# Patient Record
Sex: FEMALE | Race: BLACK OR AFRICAN AMERICAN | Employment: OTHER | URBAN - METROPOLITAN AREA
[De-identification: names, ages, dates, MRNs, and addresses within clinical notes are randomized per-mention and may not be internally consistent; named-entity substitution may affect disease eponyms.]

---

## 2017-01-24 ENCOUNTER — HOSPITAL ENCOUNTER (EMERGENCY)
Age: 52
Discharge: HOME | End: 2017-01-24
Attending: EMERGENCY MEDICINE
Payer: COMMERCIAL

## 2017-01-24 VITALS
SYSTOLIC BLOOD PRESSURE: 135 MMHG | HEART RATE: 92 BPM | DIASTOLIC BLOOD PRESSURE: 89 MMHG | TEMPERATURE: 98.1 F | RESPIRATION RATE: 18 BRPM | BODY MASS INDEX: 31.58 KG/M2 | WEIGHT: 185 LBS | OXYGEN SATURATION: 97 % | HEIGHT: 64 IN

## 2017-01-24 DIAGNOSIS — M54.42 ACUTE BILATERAL LOW BACK PAIN WITH BILATERAL SCIATICA: Primary | ICD-10-CM

## 2017-01-24 DIAGNOSIS — M54.41 ACUTE BILATERAL LOW BACK PAIN WITH BILATERAL SCIATICA: Primary | ICD-10-CM

## 2017-01-24 PROCEDURE — 99281 EMR DPT VST MAYX REQ PHY/QHP: CPT

## 2017-01-24 RX ORDER — TRAMADOL HYDROCHLORIDE 50 MG/1
50 TABLET ORAL
Qty: 12 TAB | Refills: 0 | Status: SHIPPED | OUTPATIENT
Start: 2017-01-24 | End: 2017-06-09 | Stop reason: ALTCHOICE

## 2017-01-24 RX ORDER — METHOCARBAMOL 500 MG/1
500 TABLET, FILM COATED ORAL 4 TIMES DAILY
Qty: 20 TAB | Refills: 0 | Status: SHIPPED | OUTPATIENT
Start: 2017-01-24 | End: 2017-06-01 | Stop reason: SDUPTHER

## 2017-01-24 RX ORDER — PREDNISONE 10 MG/1
TABLET ORAL
Qty: 21 TAB | Refills: 0 | Status: SHIPPED | OUTPATIENT
Start: 2017-01-24 | End: 2017-02-06

## 2017-01-25 NOTE — ED PROVIDER NOTES
Patient is a 49 y.o. female presenting with back pain and leg pain. The history is provided by the patient. Back Pain    This is a recurrent problem. Episode onset: This episode 3 weeks ago. The problem has not changed since onset. The problem occurs constantly. Patient reports not work related injury. The pain is associated with no known injury. The pain is present in the lumbar spine. The quality of the pain is described as shooting and cramping. The pain is at a severity of 5/10. The symptoms are aggravated by certain positions. Associated symptoms include leg pain. Pertinent negatives include no chest pain, no fever, no numbness, no weight loss, no headaches, no abdominal pain, no abdominal swelling, no bowel incontinence, no perianal numbness, no bladder incontinence, no dysuria, no pelvic pain, no paresthesias, no paresis, no tingling and no weakness. She has tried NSAIDs for the symptoms. Improvement on treatment: Was helping in the beginning, now not helping. Last dose 2 hours ago. The patient's surgical history includes epidural.  Leg Pain    This is a recurrent problem. The current episode started more than 1 week ago. The problem occurs constantly. The problem has not changed since onset. The pain is present in the left upper leg and right upper leg. The quality of the pain is described as aching. The pain is at a severity of 5/10. Associated symptoms include back pain. Pertinent negatives include no numbness, full range of motion, no stiffness, no tingling, no itching and no neck pain. She has tried OTC pain medications for the symptoms. The treatment provided no relief. There has been no history of extremity trauma. No other complaints. No recent fall nor history of overuse. No bowel or bladder in continence. No saddle anesthesia. No hx if IVDU. No FCNVDC, blood in urine or stool, urinary symptoms, abd or flank pain, swollen glands, rashes.       Past Medical History:   Diagnosis Date    Breast cancer [facility])      right breast- chemo and radiation    Diabetes ([facility] [address] 2016    GERD (gastroesophageal reflux disease)     H/O: pituitary tumor 2016    Hyperlipidemia 2016     initial     Hypertension 2016    Subclinical hypothyroidism 2016    Thyroid nodule 2016     multiple- no suspicous concerns       Past Surgical History:   Procedure Laterality Date    Hx gyn        x2    Implant breast silicone/eq Bilateral 4592    Hx mastectomy Bilateral 2010    Hx tumor removal  2016     pituitary tumor         Family History:   Problem Relation Age of Onset    Hypertension Mother     Diabetes Mother     Hypertension Father     Diabetes Father     Cancer Neg Hx     Stroke Neg Hx     Heart Disease Neg Hx        Social History     Social History    Marital status: SINGLE     Spouse name: N/A    Number of children: N/A    Years of education: N/A     Occupational History    Not on file. Social History Main Topics    Smoking status: Never Smoker    Smokeless tobacco: Never Used    Alcohol use No    Drug use: No    Sexual activity: Not on file     Other Topics Concern    Not on file     Social History Narrative         ALLERGIES: Adhesive tape-silicones and Morphine    Review of Systems   Constitutional: Negative for chills, fever and weight loss. HENT: Negative for ear pain, rhinorrhea and sore throat. Eyes: Negative for pain and redness. Respiratory: Negative for cough and shortness of breath. Cardiovascular: Negative for chest pain. Gastrointestinal: Negative for abdominal pain, bowel incontinence, constipation, diarrhea, nausea and vomiting. Genitourinary: Negative for bladder incontinence, dysuria and pelvic pain. Musculoskeletal: Positive for back pain and myalgias. Negative for arthralgias, gait problem, joint swelling, neck pain, neck stiffness and stiffness. Skin: Negative. Negative for itching.    Neurological: Negative for dizziness, tingling, weakness, light-headedness, numbness, headaches and paresthesias. Psychiatric/Behavioral: Negative. Vitals:    01/24/17 1931   BP: 135/89   Pulse: 92   Resp: 18   Temp: 98.1 °F (36.7 °C)   SpO2: 97%   Weight: 83.9 kg (185 lb)   Height: 5' 4\" (1.626 m)            Physical Exam   Constitutional: She appears well-developed and well-nourished. No distress. HENT:   Head: Normocephalic and atraumatic. Right Ear: Tympanic membrane, external ear and ear canal normal.   Left Ear: Tympanic membrane, external ear and ear canal normal.   Nose: Nose normal.   Mouth/Throat: Oropharynx is clear and moist and mucous membranes are normal.   Eyes: Conjunctivae and EOM are normal. Pupils are equal, round, and reactive to light. Neck: Normal range of motion. Neck supple. Cardiovascular: Normal rate, regular rhythm and normal heart sounds. Pulmonary/Chest: Effort normal and breath sounds normal.   Abdominal: Soft. Bowel sounds are normal. She exhibits no distension. There is no tenderness. There is no rebound and no guarding. Musculoskeletal: Normal range of motion. Right hip: Normal.        Left hip: Normal.        Cervical back: Normal.        Thoracic back: Normal.        Lumbar back: She exhibits tenderness, pain and spasm. She exhibits normal range of motion, no bony tenderness, no swelling, no edema, no deformity, no laceration and normal pulse. Right upper leg: She exhibits no tenderness, no bony tenderness, no swelling, no edema, no deformity and no laceration. Left upper leg: She exhibits no tenderness, no bony tenderness, no swelling, no edema, no deformity and no laceration. Right foot: Normal.        Left foot: Normal.   Lower bilateral paralumbar reproducible tenderness to palpation. (-) deformity, swelling, erythema, skin changes, midline tenderness or crepitus. (-) step off. Positive (mild) SLR bilaterlly.   FROM at the waist. Full sensation to deep and light palpation bilaterally. (-) foot drop     Lymphadenopathy:     She has no cervical adenopathy. Neurological: She is alert. She has normal strength. She is not disoriented. She displays no atrophy, no tremor and normal reflexes. No cranial nerve deficit or sensory deficit. She exhibits normal muscle tone. She displays a negative Romberg sign. She displays no seizure activity. Coordination and gait normal. GCS eye subscore is 4. GCS verbal subscore is 5. GCS motor subscore is 6. Skin: Skin is warm and dry. She is not diaphoretic. Psychiatric: She has a normal mood and affect. Her behavior is normal. Judgment and thought content normal.   Nursing note and vitals reviewed. MDM  Number of Diagnoses or Management Options  Diagnosis management comments: DDx: sciatica, spinal stenosis, arthritis, DJD, spondylitis, muscular pain/spasm, Fx (traumatic, compression, etc.), cauda equina, epidural abscess, UTI, pyelo, STD,  Eygk-Kudf-Ucilrk syndrome, kidney stones, preg, ectopic, ovarian cyst, ovarian torsion, GI etiology (constipation, pancreatitis, hepatitis, gastritis, diverticulitis, colitis, gastric cancer, etc), acute abdomen (appendicitis, SBO, etc.), AAA, malignancy    IMPRESSION AND MEDICAL DECISION MAKING:  Well-appearing pt with c/o lumbar back pain, exam is benign, NVI, sensorimotor intact, stable gait, no indicators of back emergencies, no need for imaging, will dc with supportive care. Based upon the patients presentation with noted HPI and PE, along with the work up done in the emergency department, I believe that the patient is having bilater paralumbar myofascial strain (lower back strain) with sciatica. PROGRESS: stable and improved    SPECIFIC PATIENT INSTRUCTIONS FROM THE PROVIDER WHO TREATED YOU IN THE ER TODAY:  Apply heat or cool compresses (whichever provides better relief). Start doing gentle exercises and stretches as tolerated. Drink plenty of fluids.    Finish Prednisone as directed. Take Robaxin for muscular discomfort or spasms as needed as directed. Note that this medicine may cause drowsiness. Take Tylenol/Acetaminophen (every 4-6 hours) for fever or pain as needed. Take tramadol for breakthrough pain and note that it may cause drowsiness. TAKE APPROPRIATE PRECAUTIONS. DO NOT DRIVE OR OPERATE HEAVY MACHINERY WHILE USING. Return if any concerns or worsening condition(s). Your primary doctor and orthopedic specialist or the ones provided in 1 week. Pt results have been reviewed with them. They have been counseled regarding diagnosis, treatment, and plan. Pt verbally conveys understanding and agreement of the signs, symptoms, diagnosis, treatment and prognosis and additionally agrees to follow up as discussed. Pt also agrees with the care-plan and conveys that all of their questions have been answered. I have also provided discharge instructions for them that include: educational information regarding their diagnosis and treatment, and list of reasons why they would want to return to the ED prior to their follow-up appointment, should their condition change. Rizwan HellerrPINEDA 8:36 PM           Amount and/or Complexity of Data Reviewed  Discussion of test results with the performing providers: yes  Decide to obtain previous medical records or to obtain history from someone other than the patient: yes  Obtain history from someone other than the patient: yes  Review and summarize past medical records: yes  Discuss the patient with other providers: yes  Independent visualization of images, tracings, or specimens: yes    Risk of Complications, Morbidity, and/or Mortality  Presenting problems: low  Diagnostic procedures: low  Management options: low    Patient Progress  Patient progress: stable    ED Course       Procedures      Diagnosis:   1.  Acute bilateral low back pain with bilateral sciatica          Disposition: home    Follow-up Information     Follow up With Details Comments Contact Info    [facility] EMERGENCY DEPT  As needed, If symptoms worsen [address]     Saida Govea NP In 3 days  [facility] [place] [address]  [phone]      [address] In 1 week  [address]  [phone]          Patient's Medications   Start Taking    METHOCARBAMOL (ROBAXIN) 500 MG TABLET    Take 1 Tab by mouth four (4) times daily for 5 days. PREDNISONE (STERAPRED DS) 10 MG DOSE PACK    Take as directed. TRAMADOL (ULTRAM) 50 MG TABLET    Take 1 Tab by mouth every six (6) hours as needed for Pain. Max Daily Amount: 200 mg. Continue Taking    ALBUTEROL (PROVENTIL HFA, VENTOLIN HFA, PROAIR HFA) 90 MCG/ACTUATION INHALER    Take 1 Puff by inhalation every four (4) hours as needed for Wheezing or Shortness of Breath. AMLODIPINE (NORVASC) 5 MG TABLET    Take 1 Tab by mouth daily. ATORVASTATIN (LIPITOR) 40 MG TABLET    Take 1 Tab by mouth daily. GLIMEPIRIDE (AMARYL) 2 MG TABLET    Take 1 Tab by mouth every morning. GLUCOSE BLOOD VI TEST STRIPS (ASCENSIA AUTODISC VI, ONE TOUCH ULTRA TEST VI) STRIP    Check glucose daily in the AM    HYDROCHLOROTHIAZIDE (HYDRODIURIL) 25 MG TABLET    Take 1 Tab by mouth daily. LANCETS MISC    Check glucose daily. One touch Ultra lancets. METFORMIN ER (GLUCOPHAGE XR) 500 MG TABLET    Take 1 Tab by mouth two (2) times daily (with meals). RANITIDINE (ZANTAC) 150 MG TABLET    Take 1 Tab by mouth two (2) times a day. These Medications have changed    No medications on file   Stop Taking    BENZONATATE (TESSALON) 100 MG CAPSULE    Take 1 Cap by mouth three (3) times daily as needed for Cough. CYCLOBENZAPRINE (FLEXERIL) 5 MG TABLET    Take 1 Tab by mouth every twelve (12) hours as needed for Muscle Spasm(s).     LORATADINE (CLARITIN) 10 MG TABLET    Take 1 Tab by mouth daily.

## 2017-01-25 NOTE — ED NOTES
Ramon Ames is a 49 y.o. female that was discharged in good condition. The patients diagnosis, condition and treatment were explained to  patient and aftercare instructions were given. The patient verbalized understanding. Patient armband removed and shredded.

## 2017-01-26 ENCOUNTER — OFFICE VISIT (OUTPATIENT)
Dept: FAMILY MEDICINE CLINIC | Age: 52
End: 2017-01-26

## 2017-01-26 VITALS
TEMPERATURE: 97.8 F | BODY MASS INDEX: 31.58 KG/M2 | RESPIRATION RATE: 18 BRPM | HEART RATE: 98 BPM | HEIGHT: 64 IN | DIASTOLIC BLOOD PRESSURE: 76 MMHG | OXYGEN SATURATION: 94 % | WEIGHT: 185 LBS | SYSTOLIC BLOOD PRESSURE: 108 MMHG

## 2017-01-26 DIAGNOSIS — K59.00 CONSTIPATION, UNSPECIFIED CONSTIPATION TYPE: ICD-10-CM

## 2017-01-26 DIAGNOSIS — R09.89 SENSATION OF FOREIGN BODY IN THROAT: ICD-10-CM

## 2017-01-26 DIAGNOSIS — Z12.11 SCREENING FOR COLORECTAL CANCER: ICD-10-CM

## 2017-01-26 DIAGNOSIS — R13.10 DYSPHAGIA, UNSPECIFIED TYPE: ICD-10-CM

## 2017-01-26 DIAGNOSIS — Z12.12 SCREENING FOR COLORECTAL CANCER: ICD-10-CM

## 2017-01-26 DIAGNOSIS — M54.50 ACUTE BILATERAL LOW BACK PAIN WITHOUT SCIATICA: Primary | ICD-10-CM

## 2017-01-26 RX ORDER — AMOXICILLIN 250 MG
1 CAPSULE ORAL DAILY
Qty: 30 TAB | Refills: 11 | Status: SHIPPED | OUTPATIENT
Start: 2017-01-26 | End: 2021-08-26

## 2017-01-26 RX ORDER — DOCUSATE SODIUM 100 MG/1
100 CAPSULE, LIQUID FILLED ORAL 2 TIMES DAILY
Qty: 60 CAP | Refills: 11 | Status: SHIPPED | OUTPATIENT
Start: 2017-01-26 | End: 2017-06-09 | Stop reason: SDUPTHER

## 2017-01-26 NOTE — PROGRESS NOTES
HISTORY OF PRESENT ILLNESS  Suyapa Markham is a 49 y.o. female. 1/26/2017  11:41 AM    Chief Complaint   Patient presents with   [facility] Follow Up     leg and back pain       HPI: Here today for follow up from ER visit- evaluated 1/24 for back pain and leg pain. Given Tramadol, Robaxin, and Prednisone taper. No imaging or work up done in ER. She reports taking medications as prescribed and that she has been doing much better. She denies any new symptoms and pain is controlled. She denies any numbness or tingling. Continues to complain of sensation that something is stuck in her throat and that she has difficulty swallowing. She eats and drinks without difficulty when she does consume something. She reports decreased appetite and constipation as well. She reports that she is taking Colace almost every day and still not having regular, soft BMs. Denies any blood in stools. She denies any abdominal pain, heartburn, or vomiting. Still has not gotten swallow study completed that was ordered several months ago and has not gotten set up for colonoscopy. Review of Systems   Constitutional: Negative for chills and fever. HENT: Negative for ear pain and sore throat.         +sensation of something stuck in throat   Respiratory: Negative for shortness of breath, wheezing and stridor. Cardiovascular: Negative for chest pain and palpitations. Gastrointestinal: Positive for constipation. Negative for abdominal pain, blood in stool, diarrhea, heartburn, nausea and vomiting. Genitourinary: Negative for dysuria, frequency and urgency. Musculoskeletal: Negative for back pain, falls and joint pain. Neurological: Negative for tingling and sensory change.       PHQ Screening   PHQ 2 / 9, over the last two weeks 1/26/2017   Little interest or pleasure in doing things Not at all   Feeling down, depressed or hopeless Not at all   Total Score PHQ 2 0         History  Past Medical History   Diagnosis Date    Breast cancer ([facility] [address]      right breast- chemo and radiation    Diabetes ([facility] [address] 2016    GERD (gastroesophageal reflux disease)     H/O: pituitary tumor 2016    Hyperlipidemia 2016     initial     Hypertension 2016    Subclinical hypothyroidism 2016    Thyroid nodule 2016     multiple- no suspicous concerns       Past Surgical History   Procedure Laterality Date    Hx gyn        x2    Implant breast silicone/eq Bilateral 5170    Hx mastectomy Bilateral     Hx tumor removal  2016     pituitary tumor       Social History     Social History    Marital status: SINGLE     Spouse name: N/A    Number of children: N/A    Years of education: N/A     Occupational History    Not on file. Social History Main Topics    Smoking status: Never Smoker    Smokeless tobacco: Never Used    Alcohol use No    Drug use: No    Sexual activity: Not on file     Other Topics Concern    Not on file     Social History Narrative       Allergies   Allergen Reactions    Adhesive Tape-Silicones Rash    Morphine Itching       Current Outpatient Prescriptions   Medication Sig Dispense Refill    predniSONE (STERAPRED DS) 10 mg dose pack Take as directed. 21 Tab 0    methocarbamol (ROBAXIN) 500 mg tablet Take 1 Tab by mouth four (4) times daily for 5 days. 20 Tab 0    traMADol (ULTRAM) 50 mg tablet Take 1 Tab by mouth every six (6) hours as needed for Pain. Max Daily Amount: 200 mg. 12 Tab 0    hydroCHLOROthiazide (HYDRODIURIL) 25 mg tablet Take 1 Tab by mouth daily. 90 Tab 1    metFORMIN ER (GLUCOPHAGE XR) 500 mg tablet Take 1 Tab by mouth two (2) times daily (with meals). 180 Tab 1    atorvastatin (LIPITOR) 40 mg tablet Take 1 Tab by mouth daily. 90 Tab 1    amLODIPine (NORVASC) 5 mg tablet Take 1 Tab by mouth daily. 90 Tab 1    glimepiride (AMARYL) 2 mg tablet Take 1 Tab by mouth every morning.  90 Tab 1    raNITIdine (ZANTAC) 150 mg tablet Take 1 Tab by mouth two (2) times a day. 180 Tab 3    albuterol (PROVENTIL HFA, VENTOLIN HFA, PROAIR HFA) 90 mcg/actuation inhaler Take 1 Puff by inhalation every four (4) hours as needed for Wheezing or Shortness of Breath. 1 Inhaler 0    glucose blood VI test strips (ASCENSIA AUTODISC VI, ONE TOUCH ULTRA TEST VI) strip Check glucose daily in the AM 50 Strip 0    Lancets misc Check glucose daily. One touch Ultra lancets. 50 Each 0         Patient Care Team:  Patient Care Team:  Darell Machado NP as PCP - General (Nurse Practitioner)  Santiago Brennan RN as Nurse Corey Paulino MD (Neurosurgery)        LABS:  None new to review    RADIOLOGY:  None new to review      Physical Exam   Constitutional: She is oriented to person, place, and time. She appears well-developed and well-nourished. No distress. HENT:   Mouth/Throat: Uvula is midline, oropharynx is clear and moist and mucous membranes are normal.   Neck: Normal range of motion. Neck supple. No tracheal deviation present. Cardiovascular: Normal rate, regular rhythm and normal heart sounds. No murmur heard. Pulmonary/Chest: Effort normal and breath sounds normal. No respiratory distress. Abdominal: Soft. Bowel sounds are normal. There is no tenderness. There is no rebound and no guarding. Musculoskeletal: She exhibits no edema. Lumbar back: She exhibits normal range of motion, no tenderness, no swelling, no pain and no spasm. Lymphadenopathy:     She has no cervical adenopathy. Neurological: She is alert and oriented to person, place, and time. She exhibits normal muscle tone. Coordination normal.   Skin: Skin is warm and dry.         Vitals:    01/26/17 1127   BP: 108/76   Pulse: 98   Resp: 18   Temp: 97.8 °F (36.6 °C)   TempSrc: Oral   SpO2: 94%   Weight: 185 lb (83.9 kg)   Height: 5' 4\" (1.626 m)   PainSc:   0 - No pain     Wt Readings from Last 3 Encounters:   01/26/17 185 lb (83.9 kg)   01/24/17 185 lb (83.9 kg)   12/20/16 193 lb (87.5 kg)       ASSESSMENT and Mariaelena Katz was seen today for hospital follow up. Diagnoses and all orders for this visit:    Acute bilateral low back pain without sciatica  *Resolved. Continue current medications- finish steroid pack. Dysphagia, unspecified type/ Sensation of foreign body in throat  *Discussed with patient- she has not even started initial work up that was ordered months ago. Called and got her scheduled for tomorrow to have swallow study completed- instructed her to go to [facility] and have done as scheduled. Constipation, unspecified constipation type  *Discussed. Recommend to increase up on stool softener BID. If not effective after a week or so, then change to Doc-Q-Lax. Colonoscopy will be beneficial.   -     docusate sodium (COLACE) 100 mg capsule; Take 1 Cap by mouth two (2) times a day. -     senna-docusate (DOC-Q-LAX) 8.6-50 mg per tablet; Take 1 Tab by mouth daily. Screening for colorectal cancer  *[facility] while patient in office. Appointment made for 2/1. Instructions given to patient and encouraged to go as scheduled. *Plan of care reviewed with patient. Patient in agreement with plan and expresses understanding. All questions answered and patient encouraged to call or RTO if further questions or concerns. Follow-up Disposition:  Return for already scheduled appointment. Nii Dailey

## 2017-01-26 NOTE — TELEPHONE ENCOUNTER
Return call made to pt using two identifiers. Pt made aware that she can try the OTC medications Robitussin or  Delsm for her cough and if the cough continues she can come back in to be evaluated. Pt verbalized understanding.

## 2017-01-26 NOTE — PATIENT INSTRUCTIONS
Constipation: Care Instructions  Your Care Instructions  Constipation means that you have a hard time passing stools (bowel movements). People pass stools from 3 times a day to once every 3 days. What is normal for you may be different. Constipation may occur with pain in the rectum and cramping. The pain may get worse when you try to pass stools. Sometimes there are small amounts of bright red blood on toilet paper or the surface of stools. This is because of enlarged veins near the rectum (hemorrhoids). A few changes in your diet and lifestyle may help you avoid ongoing constipation. Your doctor may also prescribe medicine to help loosen your stool. Some medicines can cause constipation. These include pain medicines and antidepressants. Tell your doctor about all the medicines you take. Your doctor may want to make a medicine change to ease your symptoms. Follow-up care is a key part of your treatment and safety. Be sure to make and go to all appointments, and call your doctor if you are having problems. It's also a good idea to know your test results and keep a list of the medicines you take. How can you care for yourself at home? · Drink plenty of fluids, enough so that your urine is light yellow or clear like water. If you have kidney, heart, or liver disease and have to limit fluids, talk with your doctor before you increase the amount of fluids you drink. · Include high-fiber foods in your diet each day. These include fruits, vegetables, beans, and whole grains. · Get at least 30 minutes of exercise on most days of the week. Walking is a good choice. You also may want to do other activities, such as running, swimming, cycling, or playing tennis or team sports. · Take a fiber supplement, such as Citrucel or Metamucil, every day. Read and follow all instructions on the label. · Schedule time each day for a bowel movement. A daily routine may help.  Take your time having your bowel movement. · Support your feet with a small step stool when you sit on the toilet. This helps flex your hips and places your pelvis in a squatting position. · Your doctor may recommend an over-the-counter laxative to relieve your constipation. Examples are Milk of Magnesia and MiraLax. Read and follow all instructions on the label. Do not use laxatives on a long-term basis. When should you call for help? Call your doctor now or seek immediate medical care if:  · You have new or worse belly pain. · You have new or worse nausea or vomiting. · You have blood in your stools. Watch closely for changes in your health, and be sure to contact your doctor if:  · Your constipation is getting worse. · You do not get better as expected. Where can you learn more? Go to http://kvng-koki.info/. Enter 21 [phone] in the search box to learn more about \"Constipation: Care Instructions. \"  Current as of: May 27, 2016  Content Version: 11.1  © 4582-1561 [employer]. Care instructions adapted under license by [facility] (which disclaims liability or warranty for this information). If you have questions about a medical condition or this instruction, always ask your healthcare professional. [address] any warranty or liability for your use of this information. Learning About Swallowing Problems  What are swallowing problems? Certain health problems that affect the nervous system can cause trouble swallowing. These conditions include stroke, ALS (also known as Dora Gehrig's disease), Parkinson's disease, and multiple sclerosis. The muscles and nerves that help move food through the throat and esophagus may not work right. Growths, such as cancer, and other problems with your esophagus can also make it hard to swallow. The esophagus is the tube that leads from your throat to your stomach. How are swallowing problems diagnosed?   A doctor or speech therapist will examine you to check for swallowing problems. You may get swallowing tests to check how well your throat muscles work. For these tests, you swallow a special liquid that helps the doctor see your throat and esophagus on an X-ray or video screen. Other tests use a thin, flexible tube called a scope to check for problems with your esophagus. The doctor puts the scope in your mouth and down your throat to look at your esophagus. What are the symptoms? Symptoms of swallowing problems may include:  · Trouble getting food or liquids to go down on the first try. · Gagging, choking, or coughing when you swallow. · Having food or liquids come back up through your throat, mouth, or nose after you swallow. · Feeling like foods or liquids are stuck in some part of your throat or chest.  · Pain when you swallow. How are swallowing problems treated? How swallowing problems are treated depends on the cause. The main goals of treatment will be to help you eat and swallow safely and get good nutrition. This is important for your health and quality of life. You may learn exercises to train your throat muscles to work together so you're able to swallow better. Learning certain ways to put food in your mouth or to position your head while eating may also help. Your doctor or a speech therapist may recommend changes to your diet to help make it easier to swallow. You may need to avoid certain foods or liquids. You also may need to change the thickness of foods or liquids in your diet. To eat and swallow safely, follow any instructions you get from your doctor or therapist. These ideas may help:  · Sit upright when eating, drinking, and taking pills. · Take small bites of food. Chew completely and swallow before taking another bite. · Take small sips of liquids. Hold the liquid in your mouth as you prepare to swallow. · If eating makes you tired, eat smaller but more frequent meals.   · If you cough or choke, lean forward and keep your chin tipped downward while you cough. Where can you learn more? Go to http://kvng-koki.info/. Enter [id number] in the search box to learn more about \"Learning About Swallowing Problems. \"  Current as of: May 27, 2016  Content Version: 11.1  © 0356-9491 [employer]. Care instructions adapted under license by [employer] (which disclaims liability or warranty for this information). If you have questions about a medical condition or this instruction, always ask your healthcare professional. [address] any warranty or liability for your use of this information.

## 2017-02-01 ENCOUNTER — CLINICAL SUPPORT (OUTPATIENT)
Dept: SURGERY | Age: 52
End: 2017-02-01

## 2017-02-01 VITALS
HEART RATE: 99 BPM | OXYGEN SATURATION: 97 % | TEMPERATURE: 98 F | BODY MASS INDEX: 31.24 KG/M2 | WEIGHT: 182.98 LBS | RESPIRATION RATE: 17 BRPM | HEIGHT: 64 IN

## 2017-02-01 DIAGNOSIS — Z12.11 COLON CANCER SCREENING: Primary | ICD-10-CM

## 2017-02-01 NOTE — PROGRESS NOTES
Review of Systems   Constitutional: Positive for weight loss. Negative for chills, diaphoresis, fever and malaise/fatigue. Due to meds   HENT: Negative. Eyes: Positive for blurred vision. Negative for double vision, photophobia, pain, discharge and redness. Right eye   Respiratory: Positive for cough. Negative for hemoptysis, sputum production, shortness of breath and wheezing. Cardiovascular: Negative. Gastrointestinal: Positive for constipation. Negative for abdominal pain, blood in stool, diarrhea, heartburn, melena, nausea and vomiting. Genitourinary: Negative. Musculoskeletal: Positive for neck pain. Negative for back pain, falls, joint pain and myalgias. Skin: Negative. Neurological: Negative. Negative for weakness. Endo/Heme/Allergies: Negative. Psychiatric/Behavioral: Negative. Colon Screen    Patient: Jerome Laura MRN: [id number]  SSN: [id number]    YOB: 1965  Age: 49 y.o. Sex: female        Subjective:   Jerome Laura was referred by her PCP, Darell Machado NP. Patient referred for colonoscopy for   Screening colonoscopy. Patient denies rectal pain or bleeding. Abdominal surgeries as described below, specifically 2  sections. Family history as described below, specifically none. Patient has never had a colonoscopy.     Allergies   Allergen Reactions    Adhesive Tape-Silicones Rash    Morphine Itching       Past Medical History   Diagnosis Date    Breast cancer ([address]      right breast- chemo and radiation    Diabetes ([facility] [address] [facility] 2016    GERD (gastroesophageal reflux disease)     H/O seasonal allergies     H/O: pituitary tumor     Hyperlipidemia 2016     initial     Hypertension 2016    Subclinical hypothyroidism 2016    Thyroid nodule 2016     multiple- no suspicous concerns     Past Surgical History   Procedure Laterality Date    Hx gyn        x2    Implant breast silicone/eq Bilateral 2012    Hx mastectomy Bilateral 2010    Hx tumor removal  6/6/2016     pituitary tumor      Family History   Problem Relation Age of Onset    Hypertension Mother     Diabetes Mother     Hypertension Father     Diabetes Father     Cancer Neg Hx     Stroke Neg Hx     Heart Disease Neg Hx      Social History   Substance Use Topics    Smoking status: Never Smoker    Smokeless tobacco: Never Used    Alcohol use No      Prior to Admission medications    Medication Sig Start Date End Date Taking? Authorizing Provider   docusate sodium (COLACE) 100 mg capsule Take 1 Cap by mouth two (2) times a day. 1/26/17  Yes Nita Mcnair NP   traMADol (ULTRAM) 50 mg tablet Take 1 Tab by mouth every six (6) hours as needed for Pain. Max Daily Amount: 200 mg. 1/24/17  Yes Rosalia Shahid PA-C   hydroCHLOROthiazide (HYDRODIURIL) 25 mg tablet Take 1 Tab by mouth daily. 12/20/16  Yes Nita Mcnair NP   metFORMIN ER (GLUCOPHAGE XR) 500 mg tablet Take 1 Tab by mouth two (2) times daily (with meals). 12/20/16  Yes Nita Mcnair NP   atorvastatin (LIPITOR) 40 mg tablet Take 1 Tab by mouth daily. 12/20/16  Yes Nita Mcnair NP   amLODIPine (NORVASC) 5 mg tablet Take 1 Tab by mouth daily. 12/20/16  Yes Nita Mcnair NP   glimepiride (AMARYL) 2 mg tablet Take 1 Tab by mouth every morning. 12/20/16  Yes Nita Mcnair NP   raNITIdine (ZANTAC) 150 mg tablet Take 1 Tab by mouth two (2) times a day. 12/20/16  Yes Nita Mcnair NP   albuterol (PROVENTIL HFA, VENTOLIN HFA, PROAIR HFA) 90 mcg/actuation inhaler Take 1 Puff by inhalation every four (4) hours as needed for Wheezing or Shortness of Breath. 5/24/16  Yes Nita Mcnair NP   glucose blood VI test strips (ASCENSIA AUTODISC VI, ONE TOUCH ULTRA TEST VI) strip Check glucose daily in the AM 4/29/16  Yes Nita Mcnair NP   Lancets misc Check glucose daily. One touch Ultra lancets.  4/29/16  Yes Nita Mcnair NP   senna-docusate (DOC-Q-LAX) 8.6-50 mg per tablet Take 1 Tab by mouth daily. 1/26/17   Rosa Mancini NP   predniSONE (STERAPRED DS) 10 mg dose pack Take as directed. 1/24/17   Sommer Gayle PA-C          Review of Systems:      Risks colonoscopy described- colon injury, missed lesion, anesthesia problems, bleeding       Gris Stark, LPN  February 1, 4686  1:39 PM

## 2017-02-01 NOTE — MR AVS SNAPSHOT
Visit Information Date & Time Provider Department Dept. Phone Encounter #  
 2/1/2017  1:00 PM [facility] NURSE VISIT Lisa Jaimes [facility] [phone] [id number] Your Appointments 3/21/2017  2:00 PM  
FOLLOW UP EXAM with Vu Turpin NP [facility] Appt Note: 3 month f/u  
 [address] [facility] [id number]  
[facility] [address] Upcoming Health Maintenance Date Due  
 FOOT EXAM Q1 5/28/1976 EYE EXAM RETINAL OR DILATED Q1 5/28/1976 COLONOSCOPY 5/28/1984 Pneumococcal 19-64 Medium Risk (1 of 1 - PPSV23) 5/28/1985 DTaP/Tdap/Td series (1 - Tdap) 5/28/1987 HEMOGLOBIN A1C Q6M 1/20/2017 MICROALBUMIN Q1 5/20/2017 LIPID PANEL Q1 5/20/2017 BREAST CANCER SCRN MAMMOGRAM 6/29/2017 PAP AKA CERVICAL CYTOLOGY 6/17/2021 Allergies as of 2/1/2017  Review Complete On: 2/1/2017 By: Vladimir Camacho. Early, LPN Severity Noted Reaction Type Reactions Adhesive Tape-silicones  05/02/2343    Rash Morphine  09/22/2013    Itching Current Immunizations  Never Reviewed No immunizations on file. Not reviewed this visit Vitals Pulse Temp Resp Height(growth percentile) Weight(growth percentile) SpO2  
 99 98 °F (36.7 °C) (Oral) 17 5' 4\" (1.626 m) 182 lb 15.7 oz (83 kg) 97% BMI OB Status Smoking Status 31.41 kg/m2 Menopause Never Smoker BMI and BSA Data Body Mass Index Body Surface Area  
 31.41 kg/m 2 1.94 m 2 Preferred Pharmacy Pharmacy Name Phone [phone] [address] [phone] Your Updated Medication List  
  
   
This list is accurate as of: 2/1/17  1:33 PM.  Always use your most recent med list.  
  
  
  
  
 albuterol 90 mcg/actuation inhaler Commonly known as:  PROVENTIL HFA, VENTOLIN HFA, PROAIR HFA  
 Take 1 Puff by inhalation every four (4) hours as needed for Wheezing or Shortness of Breath. amLODIPine 5 mg tablet Commonly known as:  Horris Bills Take 1 Tab by mouth daily. atorvastatin 40 mg tablet Commonly known as:  LIPITOR Take 1 Tab by mouth daily. docusate sodium 100 mg capsule Commonly known as:  Sherl Bicker Take 1 Cap by mouth two (2) times a day. glimepiride 2 mg tablet Commonly known as:  AMARYL Take 1 Tab by mouth every morning. glucose blood VI test strips strip Commonly known as:  ASCENSIA AUTODISC VI, ONE TOUCH ULTRA TEST VI Check glucose daily in the AM  
  
 hydroCHLOROthiazide 25 mg tablet Commonly known as:  HYDRODIURIL Take 1 Tab by mouth daily. Lancets Misc Check glucose daily. One touch Ultra lancets. metFORMIN  mg tablet Commonly known as:  GLUCOPHAGE XR Take 1 Tab by mouth two (2) times daily (with meals). predniSONE 10 mg dose pack Commonly known as:  STERAPRED DS Take as directed. raNITIdine 150 mg tablet Commonly known as:  ZANTAC Take 1 Tab by mouth two (2) times a day. senna-docusate 8.6-50 mg per tablet Commonly known as:  DOC-Q-LAX Take 1 Tab by mouth daily. traMADol 50 mg tablet Commonly known as:  ULTRAM  
Take 1 Tab by mouth every six (6) hours as needed for Pain. Max Daily Amount: 200 mg. Please provide this summary of care documentation to your next provider. Your primary care clinician is listed as Josiane Coffman. If you have any questions after today's visit, please call [phone].

## 2017-02-14 ENCOUNTER — ANESTHESIA EVENT (OUTPATIENT)
Dept: ENDOSCOPY | Age: 52
End: 2017-02-14
Payer: COMMERCIAL

## 2017-02-15 ENCOUNTER — SURGERY (OUTPATIENT)
Age: 52
End: 2017-02-15

## 2017-02-15 ENCOUNTER — HOSPITAL ENCOUNTER (OUTPATIENT)
Age: 52
Setting detail: OUTPATIENT SURGERY
Discharge: HOME | End: 2017-02-15
Attending: COLON & RECTAL SURGERY | Admitting: COLON & RECTAL SURGERY
Payer: COMMERCIAL

## 2017-02-15 ENCOUNTER — ANESTHESIA (OUTPATIENT)
Dept: ENDOSCOPY | Age: 52
End: 2017-02-15
Payer: COMMERCIAL

## 2017-02-15 VITALS
BODY MASS INDEX: 30.73 KG/M2 | RESPIRATION RATE: 17 BRPM | DIASTOLIC BLOOD PRESSURE: 84 MMHG | HEART RATE: 88 BPM | OXYGEN SATURATION: 100 % | HEIGHT: 64 IN | WEIGHT: 180 LBS | TEMPERATURE: 98 F | SYSTOLIC BLOOD PRESSURE: 118 MMHG

## 2017-02-15 LAB
BUN BLD-MCNC: <3 MG/DL (ref 7–18)
BUN BLD-MCNC: <3 MG/DL (ref 7–18)
CHLORIDE BLD-SCNC: 92 MMOL/L (ref 100–108)
CHLORIDE BLD-SCNC: 93 MMOL/L (ref 100–108)
GLUCOSE BLD STRIP.AUTO-MCNC: 107 MG/DL (ref 70–110)
GLUCOSE BLD STRIP.AUTO-MCNC: 118 MG/DL (ref 74–106)
GLUCOSE BLD STRIP.AUTO-MCNC: 125 MG/DL (ref 74–106)
HCG UR QL: NEGATIVE
HCT VFR BLD CALC: 42 % (ref 36–49)
HCT VFR BLD CALC: 45 % (ref 36–49)
HGB BLD-MCNC: 14.3 G/DL (ref 12–16)
HGB BLD-MCNC: 15.3 G/DL (ref 12–16)
POTASSIUM BLD-SCNC: 2.6 MMOL/L (ref 3.5–5.5)
POTASSIUM BLD-SCNC: 3.2 MMOL/L (ref 3.5–5.5)
SODIUM BLD-SCNC: 138 MMOL/L (ref 136–145)
SODIUM BLD-SCNC: 139 MMOL/L (ref 136–145)

## 2017-02-15 PROCEDURE — 74011250636 HC RX REV CODE- 250/636: Performed by: ANESTHESIOLOGY

## 2017-02-15 PROCEDURE — 77030008565 HC TBNG SUC IRR ERBE -B: Performed by: COLON & RECTAL SURGERY

## 2017-02-15 PROCEDURE — 74011250637 HC RX REV CODE- 250/637: Performed by: ANESTHESIOLOGY

## 2017-02-15 PROCEDURE — 77030020848 HC CATH THOR PLEURVC TELE -A: Performed by: COLON & RECTAL SURGERY

## 2017-02-15 PROCEDURE — 81025 URINE PREGNANCY TEST: CPT

## 2017-02-15 PROCEDURE — 77030018846 HC SOL IRR STRL H20 ICUM -A: Performed by: COLON & RECTAL SURGERY

## 2017-02-15 PROCEDURE — 82962 GLUCOSE BLOOD TEST: CPT

## 2017-02-15 PROCEDURE — 76060000031 HC ANESTHESIA FIRST 0.5 HR: Performed by: COLON & RECTAL SURGERY

## 2017-02-15 PROCEDURE — 76040000019: Performed by: COLON & RECTAL SURGERY

## 2017-02-15 PROCEDURE — 74011250636 HC RX REV CODE- 250/636

## 2017-02-15 PROCEDURE — 82947 ASSAY GLUCOSE BLOOD QUANT: CPT

## 2017-02-15 RX ORDER — SODIUM CHLORIDE 0.9 % (FLUSH) 0.9 %
5-10 SYRINGE (ML) INJECTION AS NEEDED
Status: CANCELLED | OUTPATIENT
Start: 2017-02-15

## 2017-02-15 RX ORDER — FAMOTIDINE 20 MG/1
20 TABLET, FILM COATED ORAL ONCE
Status: COMPLETED | OUTPATIENT
Start: 2017-02-15 | End: 2017-02-15

## 2017-02-15 RX ORDER — ONDANSETRON 2 MG/ML
4 INJECTION INTRAMUSCULAR; INTRAVENOUS ONCE
Status: CANCELLED | OUTPATIENT
Start: 2017-02-15 | End: 2017-02-15

## 2017-02-15 RX ORDER — DEXTROSE 50 % IN WATER (D50W) INTRAVENOUS SYRINGE
25-50 AS NEEDED
Status: CANCELLED | OUTPATIENT
Start: 2017-02-15

## 2017-02-15 RX ORDER — INSULIN LISPRO 100 [IU]/ML
INJECTION, SOLUTION INTRAVENOUS; SUBCUTANEOUS ONCE
Status: CANCELLED | OUTPATIENT
Start: 2017-02-15 | End: 2017-02-16

## 2017-02-15 RX ORDER — PROPOFOL 10 MG/ML
INJECTION, EMULSION INTRAVENOUS AS NEEDED
Status: DISCONTINUED | OUTPATIENT
Start: 2017-02-15 | End: 2017-02-15 | Stop reason: HOSPADM

## 2017-02-15 RX ORDER — SODIUM CHLORIDE, SODIUM LACTATE, POTASSIUM CHLORIDE, CALCIUM CHLORIDE 600; 310; 30; 20 MG/100ML; MG/100ML; MG/100ML; MG/100ML
75 INJECTION, SOLUTION INTRAVENOUS CONTINUOUS
Status: DISCONTINUED | OUTPATIENT
Start: 2017-02-15 | End: 2017-02-15 | Stop reason: HOSPADM

## 2017-02-15 RX ORDER — MAGNESIUM SULFATE 100 %
4 CRYSTALS MISCELLANEOUS AS NEEDED
Status: CANCELLED | OUTPATIENT
Start: 2017-02-15

## 2017-02-15 RX ORDER — INSULIN LISPRO 100 [IU]/ML
INJECTION, SOLUTION INTRAVENOUS; SUBCUTANEOUS ONCE
Status: DISCONTINUED | OUTPATIENT
Start: 2017-02-15 | End: 2017-02-15 | Stop reason: HOSPADM

## 2017-02-15 RX ADMIN — PROPOFOL 50 MG: 10 INJECTION, EMULSION INTRAVENOUS at 13:24

## 2017-02-15 RX ADMIN — PROPOFOL 30 MG: 10 INJECTION, EMULSION INTRAVENOUS at 13:29

## 2017-02-15 RX ADMIN — PROPOFOL 100 MG: 10 INJECTION, EMULSION INTRAVENOUS at 13:19

## 2017-02-15 RX ADMIN — FAMOTIDINE 20 MG: 20 TABLET ORAL at 12:45

## 2017-02-15 RX ADMIN — SODIUM CHLORIDE, SODIUM LACTATE, POTASSIUM CHLORIDE, AND CALCIUM CHLORIDE: 600; 310; 30; 20 INJECTION, SOLUTION INTRAVENOUS at 13:12

## 2017-02-15 NOTE — ANESTHESIA POSTPROCEDURE EVALUATION
Post-Anesthesia Evaluation & Assessment    Visit Vitals    /77    Pulse 88    Temp 36.4 °C (97.6 °F)    Resp 14    Ht 5' 4\" (1.626 m)    Wt 81.6 kg (180 lb)    SpO2 100%    BMI 30.9 kg/m2       Post-operative hydration adequate. Pain score (VAS): 0 Pain Scale 1: Numeric (0 - 10) (02/15/17 1404)  Pain Intensity 1: 0 (02/15/17 1404)   Managed. Mental status & Level of consciousness: alert and oriented x 3    Neurological status: moves all extremities, sensation grossly intact    Pulmonary status: airway patent, no supplemental oxygen required    Complications related to anesthesia: none    Patient has met all discharge requirements.     Additional comments:        Bernadette De Guzman MD  February 15, 2017

## 2017-02-15 NOTE — ANESTHESIA PREPROCEDURE EVALUATION
Anesthetic History     PONV          Review of Systems / Medical History  Patient summary reviewed and pertinent labs reviewed    Pulmonary                   Neuro/Psych   Within defined limits           Cardiovascular    Hypertension: well controlled              Exercise tolerance: <4 METS     GI/Hepatic/Renal     GERD: well controlled           Endo/Other    Diabetes: well controlled, type 2         Other Findings   Comments: Current Smoker? NO       Elective Surgery? Yes       Abstained from smoking 24 hours prior to anesthesia? N/A    Risk Factors for Postoperative nausea/vomiting:       History of postoperative nausea/vomiting? YES       Female? YES       Motion sickness? NO       Intended opioid administration for postoperative analgesia?   NO           Physical Exam    Airway  Mallampati: II  TM Distance: 4 - 6 cm  Neck ROM: normal range of motion   Mouth opening: Diminished (comment)     Cardiovascular  Regular rate and rhythm,  S1 and S2 normal,  no murmur, click, rub, or gallop             Dental    Dentition: Poor dentition     Pulmonary  Breath sounds clear to auscultation               Abdominal  GI exam deferred       Other Findings            Anesthetic Plan    ASA: 3  Anesthesia type: MAC          Induction: Intravenous  Anesthetic plan and risks discussed with: Patient

## 2017-02-15 NOTE — IP AVS SNAPSHOT
Glory La 
 
 
 [address] Patient: More Larkin MRN: [id number] :1965 You are allergic to the following Allergen Reactions Adhesive Tape-Silicones Rash Morphine Itching Recent Documentation Height Weight BMI OB Status Smoking Status 1.626 m 81.6 kg 30.9 kg/m2 Medically Induced Never Smoker Emergency Contacts Name Discharge Info Relation Home Work Mobile Lindsey Rm DISCHARGE CAREGIVER [3] Child [2] [phone] [address] [place] [address]  CAREGIVER [3] Child [2] [phone] About your hospitalization You were admitted on:  February 15, 2017 You last received care in the:  [facility] PACU You were discharged on:  February 15, 2017 Unit phone number:  [phone] Why you were hospitalized Your primary diagnosis was:  Not on File Providers Seen During Your Hospitalizations Provider Role Specialty Primary office phone Juarez Suresh MD Attending Provider Colon and Rectal Surgery [phone] Your Primary Care Physician (PCP) Primary Care Physician Office Phone Office Fax Carlos Batista [phone] Follow-up Information Follow up With Details Comments Contact Info Avis Jo NP   [address] [address] [phone] Juarez Suresh MD  Please contact Dr Elaina Gilliam for any questions [place] [address] [address] 200 [facility] 
[phone] Your Appointments 2017  2:00 PM EDT FOLLOW UP EXAM with Avis Jo NP [facility] ([address]) [address] [facility] [address] [phone] Current Discharge Medication List  
  
CONTINUE these medications which have CHANGED Dose & Instructions Dispensing Information Comments Morning Noon Evening Bedtime  
 docusate sodium 100 mg capsule Commonly known as:  Leena Pro What changed:   
- when to take this 
- reasons to take this Your next dose is: Today, Tomorrow Other:  _________ Dose:  100 mg Take 1 Cap by mouth two (2) times a day. Quantity:  60 Cap Refills:  11 CONTINUE these medications which have NOT CHANGED Dose & Instructions Dispensing Information Comments Morning Noon Evening Bedtime  
 albuterol 90 mcg/actuation inhaler Commonly known as:  PROVENTIL HFA, VENTOLIN HFA, PROAIR HFA Your next dose is: Today, Tomorrow Other:  _________ Dose:  1 Puff Take 1 Puff by inhalation every four (4) hours as needed for Wheezing or Shortness of Breath. Quantity:  1 Inhaler Refills:  0  
     
   
   
   
  
 amLODIPine 5 mg tablet Commonly known as:  Yun Mady Your next dose is: Today, Tomorrow Other:  _________ Dose:  5 mg Take 1 Tab by mouth daily. Quantity:  90 Tab Refills:  1  
     
   
   
   
  
 atorvastatin 40 mg tablet Commonly known as:  LIPITOR Your next dose is: Today, Tomorrow Other:  _________ Dose:  40 mg Take 1 Tab by mouth daily. Quantity:  90 Tab Refills:  1  
     
   
   
   
  
 glimepiride 2 mg tablet Commonly known as:  AMARYL Your next dose is: Today, Tomorrow Other:  _________ Dose:  2 mg Take 1 Tab by mouth every morning. Quantity:  90 Tab Refills:  1  
     
   
   
   
  
 glucose blood VI test strips strip Commonly known as:  ASCENSIA AUTODISC VI, ONE TOUCH ULTRA TEST VI Your next dose is: Today, Tomorrow Other:  _________ Check glucose daily in the AM  
 Quantity:  50 Strip Refills:  0  
     
   
   
   
  
 hydroCHLOROthiazide 25 mg tablet Commonly known as:  HYDRODIURIL Your next dose is: Today, Tomorrow Other:  _________ Dose:  25 mg Take 1 Tab by mouth daily. Quantity:  90 Tab Refills:  1 Lancets Misc Your next dose is: Today, Tomorrow Other:  _________ Check glucose daily. One touch Ultra lancets. Quantity:  50 Each Refills:  0  
     
   
   
   
  
 metFORMIN  mg tablet Commonly known as:  GLUCOPHAGE XR Your next dose is: Today, Tomorrow Other:  _________ Dose:  500 mg Take 1 Tab by mouth two (2) times daily (with meals). Quantity:  180 Tab Refills:  1  
     
   
   
   
  
 raNITIdine 150 mg tablet Commonly known as:  ZANTAC Your next dose is: Today, Tomorrow Other:  _________ Dose:  150 mg Take 1 Tab by mouth two (2) times a day. Quantity:  180 Tab Refills:  3  
     
   
   
   
  
 senna-docusate 8.6-50 mg per tablet Commonly known as:  DOC-Q-LAX Your next dose is: Today, Tomorrow Other:  _________ Dose:  1 Tab Take 1 Tab by mouth daily. Quantity:  30 Tab Refills:  11  
     
   
   
   
  
 traMADol 50 mg tablet Commonly known as:  ULTRAM  
   
Your next dose is: Today, Tomorrow Other:  _________ Dose:  50 mg Take 1 Tab by mouth every six (6) hours as needed for Pain. Max Daily Amount: 200 mg. Quantity:  12 Tab Refills:  0 Discharge Instructions Colonoscopy Discharge Instructions Patrickosmar Natalia [id number] 1965 COLONOSCOPY FINDINGS: 
Your colonoscopy showed: 1. Mild internal hemorrhoids, Benign hypertrophied anal papillae 2. Otherwise normal examination. FOLLOW UP RECOMMENDATIONS: 
  Dr. Nell Christie recommends your next colonoscopy in 10 years. DISCOMFORT: 
If you have redness at your IV site- apply warm compress to area; if redness or soreness persist- contact your physician There may be a slight amount of blood passed from the rectum, more than a teaspoon of bright red blood is not expected - contact your physician Gaseous discomfort is common- walking, belching will help relieve any gas pains. If discomfort persist- contact your physician DIET: 
 High fiber diet. ACTIVITY: 
You may resume your normal daily activities, however, it is recommended that you spend the remainder of the day resting - avoiding any strenuous activities. You may not operate a vehicle for 24 hours You may not engage in an occupation involving machinery or appliances for rest of today You may not drink alcoholic beverages for at least 24 hours Avoid making any critical decisions for at least 24 hour CALL M.D. ANY SIGN OF: Increasing pain, nausea, vomiting Abdominal distension (swelling) New increased bleeding Fever or chills Pain in chest area or shortness of breath Norma Malhotra MD, FACS, FASCRS Colon and Rectal Surgery [facility] Office [phone] Fax     [phone] High-Fiber Diet: Care Instructions Your Care Instructions A high-fiber diet may help you relieve constipation and feel less bloated. Your doctor and dietitian will help you make a high-fiber eating plan based on your personal needs. The plan will include the things you like to eat. It will also make sure that you get 30 grams of fiber a day. Before you make changes to the way you eat, be sure to talk with your doctor or dietitian. Follow-up care is a key part of your treatment and safety. Be sure to make and go to all appointments, and call your doctor if you are having problems. It's also a good idea to know your test results and keep a list of the medicines you take. How can you care for yourself at home? · You can increase how much fiber you get if you eat more of certain foods. These foods include: ¨ Whole-grain breads and cereals. ¨ Fruits, such as pears, apples, and peaches. Eat the skins, peels, and seeds, if you can. ¨ Vegetables, such as broccoli, cabbage, spinach, carrots, asparagus, and squash. ¨ Starchy vegetables. These include potatoes with skins, kidney beans, and lima beans. · Take a fiber supplement every day if your doctor recommends it. Examples are Benefiber, Citrucel, FiberCon, and Metamucil. Ask your doctor how much to take. · Drink plenty of fluids, enough so that your urine is light yellow or clear like water. If you have kidney, heart, or liver disease and have to limit fluids, talk with your doctor before you increase the amount of fluids you drink. · Get some exercise every day. Exercise helps stool move through the colon. It also helps prevent constipation. · Keep a food diary. Try to notice and write down what foods cause gas, pain, or other symptoms. Then you can avoid these foods. Where can you learn more? Go to http://kvng-koki.info/. Enter Z771 in the search box to learn more about \"High-Fiber Diet: Care Instructions. \" Current as of: July 26, 2016 Content Version: 11.1 © 8419-3264 [employer]. Care instructions adapted under license by [employer] (which disclaims liability or warranty for this information). If you have questions about a medical condition or this instruction, always ask your healthcare professional. [place] [address] any warranty or liability for your use of this information. DISCHARGE SUMMARY from Nurse The following personal items are in your possession at time of discharge: 
 
Dental Appliances: None Visual Aid: Glasses PATIENT INSTRUCTIONS: 
 
 
F-face looks uneven A-arms unable to move or move unevenly S-speech slurred or non-existent T-time-call 911 as soon as signs and symptoms begin-DO NOT go  
 Back to bed or wait to see if you get better-TIME IS BRAIN. Warning Signs of HEART ATTACK Call 911 if you have these symptoms: 
? Chest discomfort. Most heart attacks involve discomfort in the center of the chest that lasts more than a few minutes, or that goes away and comes back. It can feel like uncomfortable pressure, squeezing, fullness, or pain. ? Discomfort in other areas of the upper body. Symptoms can include pain or discomfort in one or both arms, the back, neck, jaw, or stomach. ? Shortness of breath with or without chest discomfort. ? Other signs may include breaking out in a cold sweat, nausea, or lightheadedness. Don't wait more than five minutes to call [address]! Fast action can save your life. Calling 911 is almost always the fastest way to get lifesaving treatment. Emergency Medical Services staff can begin treatment when they arrive  up to an hour sooner than if someone gets to the hospital by car. The discharge information has been reviewed with the patient and family. The patient and family verbalized understanding. Discharge medications reviewed with the patient and family and appropriate educational materials and side effects teaching were provided. Discharge Orders None General Information Please provide this summary of care documentation to your next provider. Patient Signature:  ____________________________________________________________ Date:  ____________________________________________________________  
  
Rosalba Graves Provider Signature:  ____________________________________________________________ Date:  ____________________________________________________________

## 2017-02-15 NOTE — DISCHARGE INSTRUCTIONS
Colonoscopy Discharge Instructions       Praveena Montano  [id number]  1965      COLONOSCOPY FINDINGS:  Your colonoscopy showed: 1. Mild internal hemorrhoids, Benign hypertrophied anal papillae                        2. Otherwise normal examination. FOLLOW UP RECOMMENDATIONS:    Dr. Marlene Claudio recommends your next colonoscopy in 10 years. DISCOMFORT:  If you have redness at your IV site- apply warm compress to area; if redness or soreness persist- contact your physician  There may be a slight amount of blood passed from the rectum, more than a teaspoon of bright red blood is not expected - contact your physician  Gaseous discomfort is common- walking, belching will help relieve any gas pains. If discomfort persist- contact your physician    DIET:   High fiber diet. ACTIVITY:  You may resume your normal daily activities, however, it is recommended that you spend the remainder of the day resting - avoiding any strenuous activities. You may not operate a vehicle for 24 hours  You may not engage in an occupation involving machinery or appliances for rest of today  You may not drink alcoholic beverages for at least 24 hours  Avoid making any critical decisions for at least 24 hour    CALL M.D. ANY SIGN OF:   Increasing pain, nausea, vomiting  Abdominal distension (swelling)  New increased bleeding   Fever or chills  Pain in chest area or shortness of breath      Liliana Ruzi MD, FACS, FASCRS  Colon and Rectal Surgery  [facility]  Office [phone]  Fax     [phone]         High-Fiber Diet: Care Instructions  Your Care Instructions  A high-fiber diet may help you relieve constipation and feel less bloated. Your doctor and dietitian will help you make a high-fiber eating plan based on your personal needs. The plan will include the things you like to eat. It will also make sure that you get 30 grams of fiber a day.   Before you make changes to the way you eat, be sure to talk with your doctor or dietitian. Follow-up care is a key part of your treatment and safety. Be sure to make and go to all appointments, and call your doctor if you are having problems. It's also a good idea to know your test results and keep a list of the medicines you take. How can you care for yourself at home? · You can increase how much fiber you get if you eat more of certain foods. These foods include:  ¨ Whole-grain breads and cereals. ¨ Fruits, such as pears, apples, and peaches. Eat the skins, peels, and seeds, if you can. ¨ Vegetables, such as broccoli, cabbage, spinach, carrots, asparagus, and squash. ¨ Starchy vegetables. These include potatoes with skins, kidney beans, and lima beans. · Take a fiber supplement every day if your doctor recommends it. Examples are Benefiber, Citrucel, FiberCon, and Metamucil. Ask your doctor how much to take. · Drink plenty of fluids, enough so that your urine is light yellow or clear like water. If you have kidney, heart, or liver disease and have to limit fluids, talk with your doctor before you increase the amount of fluids you drink. · Get some exercise every day. Exercise helps stool move through the colon. It also helps prevent constipation. · Keep a food diary. Try to notice and write down what foods cause gas, pain, or other symptoms. Then you can avoid these foods. Where can you learn more? Go to http://kvng-koki.info/. Enter A441 in the search box to learn more about \"High-Fiber Diet: Care Instructions. \"  Current as of: July 26, 2016  Content Version: 11.1  © 5456-8639 [employer]. Care instructions adapted under license by [employer] (which disclaims liability or warranty for this information).  If you have questions about a medical condition or this instruction, always ask your healthcare professional. [address] any warranty or liability for your use of this information. DISCHARGE SUMMARY from Nurse    The following personal items are in your possession at time of discharge:    Dental Appliances: None  Visual Aid: Glasses                  PATIENT INSTRUCTIONS:    After general anesthesia or intravenous sedation, for 24 hours or while taking prescription Narcotics:  · Limit your activities  · Do not drive and operate hazardous machinery  · Do not make important personal or business decisions  · Do  not drink alcoholic beverages  · If you have not urinated within 8 hours after discharge, please contact your surgeon on call. Report the following to your surgeon:  · Excessive pain, swelling, redness or odor of or around the surgical area  · Temperature over 100.5  · Nausea and vomiting lasting longer than 4 hours or if unable to take medications  · Any signs of decreased circulation or nerve impairment to extremity: change in color, persistent  numbness, tingling, coldness or increase pain  · Any questions      *  Please give a list of your current medications to your Primary Care Provider. *  Please update this list whenever your medications are discontinued, doses are      changed, or new medications (including over-the-counter products) are added. *  Please carry medication information at all times in case of emergency situations. These are general instructions for a healthy lifestyle:    No smoking/ No tobacco products/ Avoid exposure to second hand smoke    Surgeon General's Warning:  Quitting smoking now greatly reduces serious risk to your health.     Obesity, smoking, and sedentary lifestyle greatly increases your risk for illness    A healthy diet, regular physical exercise & weight monitoring are important for maintaining a healthy lifestyle    You may be retaining fluid if you have a history of heart failure or if you experience any of the following symptoms:  Weight gain of 3 pounds or more overnight or 5 pounds in a week, increased swelling in our hands or feet or shortness of breath while lying flat in bed. Please call your doctor as soon as you notice any of these symptoms; do not wait until your next office visit. Recognize signs and symptoms of STROKE:    F-face looks uneven    A-arms unable to move or move unevenly    S-speech slurred or non-existent    T-time-call 911 as soon as signs and symptoms begin-DO NOT go       Back to bed or wait to see if you get better-TIME IS BRAIN. Warning Signs of HEART ATTACK     Call 911 if you have these symptoms:   Chest discomfort. Most heart attacks involve discomfort in the center of the chest that lasts more than a few minutes, or that goes away and comes back. It can feel like uncomfortable pressure, squeezing, fullness, or pain.  Discomfort in other areas of the upper body. Symptoms can include pain or discomfort in one or both arms, the back, neck, jaw, or stomach.  Shortness of breath with or without chest discomfort.  Other signs may include breaking out in a cold sweat, nausea, or lightheadedness. Don't wait more than five minutes to call 911 - MINUTES MATTER! Fast action can save your life. Calling 911 is almost always the fastest way to get lifesaving treatment. Emergency Medical Services staff can begin treatment when they arrive -- up to an hour sooner than if someone gets to the hospital by car. The discharge information has been reviewed with the patient and family. The patient and family verbalized understanding. Discharge medications reviewed with the patient and family and appropriate educational materials and side effects teaching were provided.

## 2017-02-15 NOTE — PROCEDURES
Colonoscopy Procedure Note      Digna Sosa  1965  [id number]                Date of Procedure: 2/15/2017    Indications:    Screening colonoscopy     Preoperative diagnosis: Colon cancer screening [Z12.11]      Postoperative diagnosis: Hemorrhoids, Hypertrophied anal papillae    Title of Procedure:  Colonoscopy, screening    :  Tuan Adamson MD    Assistant(s): Endoscopy Technician-1: Darren Khan  Endoscopy Technician-2: Valentin Mariano  Endoscopy RN-1: Mechelle Chappell RN  Endoscopy RN-2: Gerry Gibson RN    Referring Source:  Vu Turpin NP    Sedation:  MAC      ASA Class: ASA 3 - Severe systemic disease but compensated        Procedure Details:    Prior to the procedure, a history and physical were performed. The patients medications, allergies and sensitivities were reviewed and all questions were answered. After informed consent was obtained for the procedure, with all risks and benefits of procedure explained. The patient was taken to the endoscopy suite and placed in the left lateral decubitus position. Patient identification and proposed procedure were verified prior to the procedure by the nurse and I. Following the  satisfactory administration of sedation,  the anus was inspected and appeared within normal limits. Digital rectal examination revealed Normal sphincter tone and squeeze pressure. Palpation revealed No Masses. Next the Olympus video colonoscope was introduced through the anus and advanced to cecum, which was identified by the ileocecal valve and appendiceal orifice, terminal ileum. The quality of preparation was excellent. The terminal ileum was visualized. The colonoscope was then slowly withdrawn and the mucosa carefully examined for any abnormalities. Cecal withdrawl time was greater than six minutes. The patient tolerated the procedure well. Findings:   Rectum: Grade 1 internal hemorrhoid(s);   Hypertrophied anal papillae  Sigmoid: normal  Descending Colon: normal  Transverse Colon: normal  Ascending Colon: normal  Cecum: normal  Terminal Ileum: normal    Interventions:  none    Specimen Removed: * No specimens in log *     Complications: None. EBL:  None. Impression:    Hemorrhoids, Hypertrophied anal papillae, Otherwise normal exam    Recommendations: -Repeat colonoscopy in 10 years   Resume normal medication(s). Discharge Disposition:  Home in the company of a  when able to ambulate.         Eva Cao MD, FACS, FASCRS  Colon and Rectal Surgery  [facility]  Office [phone]  Fax     [phone]  2/15/2017  1:39 PM       [employer] [facility]  [place] [address]  [place], [address] [facility] [address] [facility]

## 2017-02-15 NOTE — PERIOP NOTES
Patient armband removed and shredded    Patient confirmed by two identifiers with discharge instructions prior to being provided to patient and family.

## 2017-02-15 NOTE — H&P
[facility]  [address] [address], [address] [place] [address]        Colon and Rectal Surgery History and Physical    Subjective:      Derek Shook is a 49 y.o. female who presents for colonoscopy consultation for   Screening colonoscopy. There is not a family history of colon cancer. Patient Active Problem List    Diagnosis Date Noted    History of pituitary tumor 2016    History of breast cancer 2016    Mixed hyperlipidemia 2016    History of chemotherapy 2016    Gastroesophageal reflux disease without esophagitis 2016    Essential hypertension with goal blood pressure less than 130/80 2016    Type 2 diabetes mellitus without complication ([facility] 3780    Subclinical hypothyroidism 2016     Past Medical History   Diagnosis Date    Breast cancer ([facility] [address]      right breast- chemo and radiation    Diabetes ([facility] [address] 2016    GERD (gastroesophageal reflux disease)     H/O seasonal allergies     H/O: pituitary tumor 2016    History of chemotherapy     Hyperlipidemia 2016     initial     Hypertension 2016    Nausea & vomiting     S/P radiation therapy [id number]    Subclinical hypothyroidism 2016    Thyroid nodule 2016     multiple- no suspicous concerns      Past Surgical History   Procedure Laterality Date    Hx gyn        x2    Implant breast silicone/eq Bilateral 485    Hx mastectomy Bilateral     Hx tumor removal  2016     pituitary tumor      Social History   Substance Use Topics    Smoking status: Never Smoker    Smokeless tobacco: Never Used    Alcohol use No      Family History   Problem Relation Age of Onset    Hypertension Mother     Diabetes Mother     Hypertension Father     Diabetes Father     Cancer Neg Hx     Stroke Neg Hx     Heart Disease Neg Hx       Prior to Admission medications    Medication Sig Start Date End Date Taking?  Authorizing Provider docusate sodium (COLACE) 100 mg capsule Take 1 Cap by mouth two (2) times a day. Patient taking differently: Take 100 mg by mouth two (2) times daily as needed. 1/26/17  Yes Trixie Choi NP   traMADol (ULTRAM) 50 mg tablet Take 1 Tab by mouth every six (6) hours as needed for Pain. Max Daily Amount: 200 mg. 1/24/17  Yes Stacey Eisenmenger, PA-C   hydroCHLOROthiazide (HYDRODIURIL) 25 mg tablet Take 1 Tab by mouth daily. 12/20/16  Yes Trixie Choi NP   metFORMIN ER (GLUCOPHAGE XR) 500 mg tablet Take 1 Tab by mouth two (2) times daily (with meals). 12/20/16  Yes Trixie Choi NP   atorvastatin (LIPITOR) 40 mg tablet Take 1 Tab by mouth daily. 12/20/16  Yes Trixie Choi NP   amLODIPine (NORVASC) 5 mg tablet Take 1 Tab by mouth daily. 12/20/16  Yes Trixie Choi NP   glimepiride (AMARYL) 2 mg tablet Take 1 Tab by mouth every morning. 12/20/16  Yes Trixie Choi NP   raNITIdine (ZANTAC) 150 mg tablet Take 1 Tab by mouth two (2) times a day. 12/20/16  Yes Trixie Choi NP   senna-docusate (DOC-Q-LAX) 8.6-50 mg per tablet Take 1 Tab by mouth daily. 1/26/17   Trixie Choi NP   albuterol (PROVENTIL HFA, VENTOLIN HFA, PROAIR HFA) 90 mcg/actuation inhaler Take 1 Puff by inhalation every four (4) hours as needed for Wheezing or Shortness of Breath. 5/24/16   Trixie Choi NP   glucose blood VI test strips (ASCENSIA AUTODISC VI, ONE TOUCH ULTRA TEST VI) strip Check glucose daily in the AM 4/29/16   Trixie Choi NP   Lancets misc Check glucose daily. One touch Ultra lancets.  4/29/16   Trixie Choi NP     Current Facility-Administered Medications   Medication Dose Route Frequency    lactated ringers infusion  75 mL/hr IntraVENous CONTINUOUS    famotidine (PEPCID) tablet 20 mg  20 mg Oral ONCE    insulin lispro (HUMALOG) injection   SubCUTAneous ONCE     Allergies   Allergen Reactions    Adhesive Tape-Silicones Rash    Morphine Itching          Objective:     Visit Vitals    Ht 5' 6\" (1.676 m)    Wt 83 kg (183 lb)    LMP Comment: 10/2015    BMI 29.54 kg/m2        Physical Exam:   GENERAL: alert, cooperative, no distress, appears stated age  LUNG: clear to auscultation bilaterally  HEART: regular rate and rhythm, S1, S2 normal, no murmur, click, rub or gallop  ABDOMEN: soft, non-tender. Bowel sounds normal. No masses,  no organomegaly         Assessment:     Lupillo Farmer is a 49 y.o. female who requires colonoscopy for   Screening colonoscopy. Plan:     1. I recommend proceeding with colonoscopy. The patient was in full agreement and was eager to proceed. 2. I discussed the details of the colonoscopy procedure. The risks of colonoscopy were discussed including colon injury/perforation, anesthesia issues, bleeding, and the possibility of incomplete examination. The patient was willing to accept these risks and proceed with the examination. All questions were answered to the patient's satisfaction.            Juarez Collins MD, FACS, FASCRS  Colon and Rectal Surgery  [facility]  Office [phone]  Fax     [phone]  2/15/2017  12:24 PM

## 2017-03-21 ENCOUNTER — OFFICE VISIT (OUTPATIENT)
Dept: FAMILY MEDICINE CLINIC | Age: 52
End: 2017-03-21

## 2017-03-21 VITALS
TEMPERATURE: 98.7 F | HEART RATE: 93 BPM | SYSTOLIC BLOOD PRESSURE: 115 MMHG | BODY MASS INDEX: 30.15 KG/M2 | WEIGHT: 176.6 LBS | OXYGEN SATURATION: 92 % | HEIGHT: 64 IN | RESPIRATION RATE: 18 BRPM | DIASTOLIC BLOOD PRESSURE: 82 MMHG

## 2017-03-21 DIAGNOSIS — Z87.898 HISTORY OF PITUITARY TUMOR: ICD-10-CM

## 2017-03-21 DIAGNOSIS — Z79.4 TYPE 2 DIABETES MELLITUS WITHOUT COMPLICATION, WITH LONG-TERM CURRENT USE OF INSULIN (HCC): ICD-10-CM

## 2017-03-21 DIAGNOSIS — E78.2 MIXED HYPERLIPIDEMIA: ICD-10-CM

## 2017-03-21 DIAGNOSIS — E11.9 TYPE 2 DIABETES MELLITUS WITHOUT COMPLICATION, WITH LONG-TERM CURRENT USE OF INSULIN (HCC): ICD-10-CM

## 2017-03-21 DIAGNOSIS — I10 ESSENTIAL HYPERTENSION WITH GOAL BLOOD PRESSURE LESS THAN 130/80: Primary | ICD-10-CM

## 2017-03-21 DIAGNOSIS — E03.8 SUBCLINICAL HYPOTHYROIDISM: ICD-10-CM

## 2017-03-21 LAB — HBA1C MFR BLD HPLC: 5.6 %

## 2017-03-21 NOTE — MR AVS SNAPSHOT
Visit Information Date & Time Provider Department Dept. Phone Encounter #  
 3/21/2017  2:00 PM Michela Prince NP [facility] [phone] [id number] Follow-up Instructions Return in about 3 months (around 6/21/2017). Upcoming Health Maintenance Date Due  
 FOOT EXAM Q1 5/28/1976 EYE EXAM RETINAL OR DILATED Q1 5/28/1976 Pneumococcal 19-64 Medium Risk (1 of 1 - PPSV23) 5/28/1985 DTaP/Tdap/Td series (1 - Tdap) 5/28/1987 HEMOGLOBIN A1C Q6M 1/20/2017 BREAST CANCER SCRN MAMMOGRAM 6/29/2017 MICROALBUMIN Q1 5/20/2017 LIPID PANEL Q1 5/20/2017 PAP AKA CERVICAL CYTOLOGY 6/17/2021 COLONOSCOPY 2/15/2027 Allergies as of 3/21/2017  Review Complete On: 3/21/2017 By: Ramez Borden LPN Severity Noted Reaction Type Reactions Adhesive Tape-silicones  36/46/2758    Rash Morphine  09/22/2013    Itching Current Immunizations  Never Reviewed No immunizations on file. Not reviewed this visit You Were Diagnosed With   
  
 Codes Comments Type 2 diabetes mellitus without complication, with long-term current use of insulin (HCC)    -  Primary ICD-10-CM: E11.9, Z79.4 ICD-9-CM: 250.00, V58.67 Mixed hyperlipidemia     ICD-10-CM: E78.2 ICD-9-CM: 272.2 Subclinical hypothyroidism     ICD-10-CM: E03.9 ICD-9-CM: 244.8 Vitals BP Pulse Temp Resp Height(growth percentile) Weight(growth percentile) 115/82 (BP 1 Location: Left arm, BP Patient Position: Sitting) 93 98.7 °F (37.1 °C) (Oral) 18 5' 4\" (1.626 m) 176 lb 9.6 oz (80.1 kg) SpO2 BMI OB Status Smoking Status 92% 30.31 kg/m2 Medically Induced Never Smoker BMI and BSA Data Body Mass Index Body Surface Area  
 30.31 kg/m 2 1.9 m 2 Preferred Pharmacy Pharmacy Name Phone [id number] [address] [phone] Your Updated Medication List  
  
   
 This list is accurate as of: 3/21/17  2:50 PM.  Always use your most recent med list.  
  
  
  
  
 albuterol 90 mcg/actuation inhaler Commonly known as:  PROVENTIL HFA, VENTOLIN HFA, PROAIR HFA Take 1 Puff by inhalation every four (4) hours as needed for Wheezing or Shortness of Breath. amLODIPine 5 mg tablet Commonly known as:  Luis Presser Take 1 Tab by mouth daily. atorvastatin 40 mg tablet Commonly known as:  LIPITOR Take 1 Tab by mouth daily. docusate sodium 100 mg capsule Commonly known as:  Lang Pata Take 1 Cap by mouth two (2) times a day. glimepiride 2 mg tablet Commonly known as:  AMARYL Take 1 Tab by mouth every morning. glucose blood VI test strips strip Commonly known as:  ASCENSIA AUTODISC VI, ONE TOUCH ULTRA TEST VI Check glucose daily in the AM  
  
 hydroCHLOROthiazide 25 mg tablet Commonly known as:  HYDRODIURIL Take 1 Tab by mouth daily. Lancets Misc Check glucose daily. One touch Ultra lancets. metFORMIN  mg tablet Commonly known as:  GLUCOPHAGE XR Take 1 Tab by mouth two (2) times daily (with meals). raNITIdine 150 mg tablet Commonly known as:  ZANTAC Take 1 Tab by mouth two (2) times a day. senna-docusate 8.6-50 mg per tablet Commonly known as:  DOC-Q-LAX Take 1 Tab by mouth daily. traMADol 50 mg tablet Commonly known as:  ULTRAM  
Take 1 Tab by mouth every six (6) hours as needed for Pain. Max Daily Amount: 200 mg. We Performed the Following AMB POC HEMOGLOBIN A1C [02624 CPT(R)] Follow-up Instructions Return in about 3 months (around 6/21/2017). To-Do List   
 Around 03/22/2017 Lab:  LIPID PANEL Around 03/22/2017 Lab:  METABOLIC PANEL, COMPREHENSIVE Around 03/22/2017 Lab:  T4, FREE Around 03/22/2017 Lab:  TSH 3RD GENERATION Please provide this summary of care documentation to your next provider. Your primary care clinician is listed as Noemí Santana. If you have any questions after today's visit, please call [phone].

## 2017-03-21 NOTE — PROGRESS NOTES
HISTORY OF PRESENT ILLNESS  Jerome Laura is a 49 y.o. female. 3/21/2017  11:12 AM    Chief Complaint   Patient presents with    Follow Up Chronic Condition         HPI: Here today for follow up on chronic disease. Last labs done 5/2016 routine- did not do labs as previously ordered. Follows with neurosurgery. HTN/Hyperlipidemia: Diagnosed 4/2016 and has been taking medications as prescribed. Has no complaints. No dizziness, SOB, or chest pain. Does not check BP at home. Last cholesterol done 5/2016 and - started on statin due to CV risk- did not do repeat check as ordered 12/2016. DM: Diagnosed 4/2016 and is on Metformin and Amaryl. She checks her glucose sometimes. Has improved greatly since diagnosis. Denies any side effects, no hypoglycemic episodes. Does not follow with endocrinology. Does follow with eye doctor. On statin, not on ACE. Last A1c 7.3 7/2016. Pituitary Tumor/Subclinical Hypothyroidism: Diagnosed and removed 6/2016 by Dr Francine Titus. Has been having some issues with swallowing, hearing, and vision since the surgery- hearing and vision has improved some. Neurosurgery reported to her that they were not concerned. She states that the swallowing feels like something is stuck in her throat all the time, although there is not- this has improved some- did not do swallowing study or PFTs as ordered in past. Noted to have subclinical hypothyroidism in 5/2016 and prior to that hyperthyroidism- recent US thyroid showed nodules without suspicion. Review of Systems   Constitutional: Negative for chills, fever and malaise/fatigue. HENT: Positive for hearing loss. Negative for congestion and sore throat.         +swallowing problems   Eyes: Positive for blurred vision. Negative for double vision, photophobia and pain. Respiratory: Negative for cough, hemoptysis, sputum production, shortness of breath and wheezing.     Cardiovascular: Negative for chest pain, palpitations, orthopnea and leg swelling. Gastrointestinal: Negative for abdominal pain, constipation, diarrhea, heartburn, nausea and vomiting. Genitourinary: Negative for dysuria, frequency and urgency. Musculoskeletal: Negative for back pain, joint pain and myalgias. Neurological: Negative for dizziness, tingling, sensory change, focal weakness, seizures, weakness and headaches. History  Past Medical History:   Diagnosis Date    Breast cancer ([facility] [address]     right breast- chemo and radiation    Diabetes ([facility] [address] 2016    GERD (gastroesophageal reflux disease)     H/O seasonal allergies     H/O: pituitary tumor 2016    History of chemotherapy     Hyperlipidemia 2016    initial     Hypertension 2016    S/P radiation therapy [id number]    Subclinical hypothyroidism 2016    Thyroid nodule 2016    multiple- no suspicous concerns       Past Surgical History:   Procedure Laterality Date    COLONOSCOPY N/A 2/15/2017    COLONOSCOPY performed by Terence Silver MD at [address] HX GYN       x2    HX MASTECTOMY Bilateral     HX TUMOR REMOVAL  2016    pituitary tumor    IMPLANT BREAST SILICONE/EQ Bilateral 6041       Social History     Social History    Marital status: SINGLE     Spouse name: N/A    Number of children: N/A    Years of education: N/A     Occupational History    Not on file.      Social History Main Topics    Smoking status: Never Smoker    Smokeless tobacco: Never Used    Alcohol use No    Drug use: No    Sexual activity: Not Currently     Other Topics Concern    Not on file     Social History Narrative       Family History   Problem Relation Age of Onset    Hypertension Mother     Diabetes Mother     Hypertension Father     Diabetes Father     Cancer Neg Hx     Stroke Neg Hx     Heart Disease Neg Hx        Allergies   Allergen Reactions    Adhesive Tape-Silicones Rash    Morphine Itching       Current Outpatient Prescriptions   Medication Sig Dispense Refill    docusate sodium (COLACE) 100 mg capsule Take 1 Cap by mouth two (2) times a day. (Patient taking differently: Take 100 mg by mouth two (2) times daily as needed.) 60 Cap 11    senna-docusate (DOC-Q-LAX) 8.6-50 mg per tablet Take 1 Tab by mouth daily. 30 Tab 11    traMADol (ULTRAM) 50 mg tablet Take 1 Tab by mouth every six (6) hours as needed for Pain. Max Daily Amount: 200 mg. 12 Tab 0    hydroCHLOROthiazide (HYDRODIURIL) 25 mg tablet Take 1 Tab by mouth daily. 90 Tab 1    metFORMIN ER (GLUCOPHAGE XR) 500 mg tablet Take 1 Tab by mouth two (2) times daily (with meals). 180 Tab 1    atorvastatin (LIPITOR) 40 mg tablet Take 1 Tab by mouth daily. 90 Tab 1    amLODIPine (NORVASC) 5 mg tablet Take 1 Tab by mouth daily. 90 Tab 1    glimepiride (AMARYL) 2 mg tablet Take 1 Tab by mouth every morning. 90 Tab 1    raNITIdine (ZANTAC) 150 mg tablet Take 1 Tab by mouth two (2) times a day. 180 Tab 3    albuterol (PROVENTIL HFA, VENTOLIN HFA, PROAIR HFA) 90 mcg/actuation inhaler Take 1 Puff by inhalation every four (4) hours as needed for Wheezing or Shortness of Breath. 1 Inhaler 0    glucose blood VI test strips (ASCENSIA AUTODISC VI, ONE TOUCH ULTRA TEST VI) strip Check glucose daily in the AM 50 Strip 0    Lancets misc Check glucose daily. One touch Ultra lancets.  48 Each 0         Health Maintenance and Screenings  Colon Cancer: Colonoscopy 2/2017 showed internal hemorrhoids- repeat in 10 years  COPD Screen/ Lung Cancer: Not indicated- nonsmoker; PFTs ordered due to cough  CAD Screen: 5/2016 - started on statin, recheck ordered  Diabetes: 4/2016 HgBA1c 13.3%, 7/2016 7.3%, recheck ordered  Medication: On Metformin and Amaryl  Foot exam DM: Done 4/2016  Microalbumin DM/HTN: Marcelina Amaya 5/2016- negative  STD and HIV Screen: Will discuss in future  Breast Cancer: 6/2016 BIRADS 2- history breast cancer (patient questioning reasoning behind mammograms due to masectomy)  Cervical Cancer: PAP Negative with cotesting 6/2016  Osteoporosis Screen: DEXA WNL 6/2016  Abdominal Aneurysm Screen: Not indicated  Opthalmology: Follows with eye doctor  Dentistry Services: Recommended  Hearing Impairment: No hearing loss noted  Skin Cancer Screen: Discussed self checks  Obesity Screening: Body mass index is 30.31 kg/(m^2). Flu Vaccination: Declines  Pneumococcal Vaccine: Not indicated for early vaccination  Shingles Vaccine: Not indicated for early vaccination  Tetanus Booster: Booster last done 2002- recommended, declines today  Hepatitis B Vaccinations: Not indicated        Advance Care Planning:   Patient was offered the opportunity to discuss advance care planning NO   Does patient have an Advance Directive:  NO   If no, did you provide information on [facility]? Patient Care Team:  Patient Care Team:  True Foley NP as PCP - General (Nurse Practitioner)  Lali Ascencio RN as Nurse Shikha Patel MD (Neurosurgery)        LABS:  None new to review    RADIOLOGY:  None new to review      Physical Exam   Constitutional: She is oriented to person, place, and time. She appears well-developed and well-nourished. No distress. Eyes: EOM are normal. Pupils are equal, round, and reactive to light. Neck: Normal range of motion. Neck supple. Thyromegaly present. Cardiovascular: Normal rate, regular rhythm and normal heart sounds. No murmur heard. Pulmonary/Chest: Effort normal and breath sounds normal. No respiratory distress. Abdominal: Soft. Bowel sounds are normal. She exhibits no distension. There is no tenderness. Musculoskeletal: She exhibits no edema. Neurological: She is alert and oriented to person, place, and time. No cranial nerve deficit or sensory deficit. She exhibits normal muscle tone. Coordination normal.   Skin: Skin is warm and dry.        Vitals:    03/21/17 1411   BP: 115/82   Pulse: 93   Resp: 18   Temp: 98.7 °F (37.1 °C)   TempSrc: Oral   SpO2: 92% Weight: 176 lb 9.6 oz (80.1 kg)   Height: 5' 4\" (1.626 m)   PainSc:   4   PainLoc: Generalized         ASSESSMENT and Christie Sreekanth was seen today for hospital follow up and cyst.    Diagnoses and all orders for this visit:      Essential hypertension with goal blood pressure less than 130/80  *Controlled. Continue current medication. Labs reordered. Mixed hyperlipidemia  *Check labs since being on statin. Reordered labs. - LIPID PANEL; Future    Type 2 diabetes mellitus without complication, with long-term current use of insulin ([address]  *Well controlled. Continue current medications. - AMB POC HEMOGLOBIN Z5W  - METABOLIC PANEL, COMPREHENSIVE; Future    History of pituitary tumor  *Noted. Has not gotten work up completed for symptoms of swallowing difficulties. No further management discussed. Advised to continue following with neurosurgery. Subclinical hypothyroidism  *Check labs. - TSH 3RD GENERATION; Future  - T4, FREE; Future        *Plan of care reviewed with patient. Patient in agreement with plan and expresses understanding. All questions answered and patient encouraged to call or RTO if further questions or concerns. Follow-up Disposition:  Return in about 3 months (around 6/21/2017).

## 2017-04-12 DIAGNOSIS — N63.0 BREAST LUMP: Primary | ICD-10-CM

## 2017-04-12 DIAGNOSIS — Z85.3 HISTORY OF BREAST CANCER: ICD-10-CM

## 2017-04-14 NOTE — TELEPHONE ENCOUNTER
4/14/2017  9:55 AM    Chief Complaint   Patient presents with    Medication Refill       Noted request for something to help with motion sickness. Recommend patient try Dramamine OTC. Forwarded to clinical staff to make patient aware of recommendation. Would also prefer to have more information on why she is having motion sickness.

## 2017-04-17 NOTE — TELEPHONE ENCOUNTER
Call made to pt using two identifiers. Pt made aware that she can try OTC Dramamine for motion sickness. Pt stated that she doesn't drive but while ridding in the car she get dizzy, nauseated,headache and have to come home to lay down because she feels so bad. Pt stated that she will try the medication but also stated that she had  Bilateral Mastectomy a while back but felt 3 lumps in her right breast. Pt advised that her PCP was not in the office this week but I recommend that she come in to be seen by one of our other providers if she is concerned. Pt stated that it can wait until next week but if they get any worse she will come in sooner. Message forwarded to provider for review.

## 2017-04-17 NOTE — TELEPHONE ENCOUNTER
4/17/2017  12:56 PM    Chief Complaint   Patient presents with    Medication Refill       Noted further details about motion sickness and continue to recommend Dramamine. Noted lumps palpated by patient in right breast. History of breast cancer with bilateral masectomies. Mammograms done for routine not necessary after bilateral masectomy but since she is noticing lumps, will order diagnostic mammo and US right breast. Forwarded to clinical staff to make patient aware that someone from central scheduling will be calling.

## 2017-04-18 NOTE — TELEPHONE ENCOUNTER
Call made to Pt using two identifiers. Pt made aware that her provider Claire Ashley NP would like for her to get a mammogram done and that central scheduling would be contacting her to schedule. Pt verbalized understanding.

## 2017-04-26 ENCOUNTER — NURSE NAVIGATOR (OUTPATIENT)
Dept: MAMMOGRAPHY | Age: 52
End: 2017-04-26

## 2017-04-26 ENCOUNTER — HOSPITAL ENCOUNTER (OUTPATIENT)
Dept: ULTRASOUND IMAGING | Age: 52
Discharge: HOME | End: 2017-04-26
Attending: NURSE PRACTITIONER
Payer: COMMERCIAL

## 2017-04-26 ENCOUNTER — HOSPITAL ENCOUNTER (OUTPATIENT)
Dept: MAMMOGRAPHY | Age: 52
Discharge: HOME | End: 2017-04-26
Attending: NURSE PRACTITIONER
Payer: COMMERCIAL

## 2017-04-26 DIAGNOSIS — Z85.3 HISTORY OF BREAST CANCER: ICD-10-CM

## 2017-04-26 DIAGNOSIS — N63.0 BREAST LUMP: ICD-10-CM

## 2017-04-26 PROCEDURE — 77066 DX MAMMO INCL CAD BI: CPT

## 2017-04-26 PROCEDURE — 76642 ULTRASOUND BREAST LIMITED: CPT

## 2017-04-26 NOTE — PROGRESS NOTES
Met with Ms. Jaden Connolly regarding recommendation for Right Breast Ultrasound Biopsy and breast surgeon referral. Explained procedure and answered all questions. Ms. Jaden Connolly had previously seen Dr. Tiffany Marcelino for breast surgery but wishes to see a surgeon closer to her home. I assisted in scheduling her with Dr. Tabatha Shepherd for follow up on 5/2/17 @ 10:00am.       ULTRASOUND GUIDED BREAST BIOPSY   PRE-PROCEDURE PATIENT INSTRUCTIONS      · Patient should arrive 15 minutes before scheduled procedure. · Patient may eat a regular breakfast and/or lunch - NO fasting required. · Patient should take all regular medication - EXCEPT BLOOD THINNERS. Blood thinners must be stopped at least 5 DAYS before procedure. *Coumadin, Heparin, Lovenox, Plavix   · Patient should bring a good support bra to wear after the biopsy to reduce breast motion and be more comfortable. · Only the breast will be numbed for procedure. Nothing that will make the patient drowsy or light headed so they may drive. · There may be some bruising to breast - thats normal. The body should absorb it in 5 - 7 days. · Ice should be applied to the biopsy site intermittently through out the day. · Patient may take Tylenol after procedure for pain relief. · Avoid heavy lifting and strenuous exercise for 24 hours. · Leave steri-strip on biopsy site - may shower over site and pat dry.

## 2017-05-02 ENCOUNTER — OFFICE VISIT (OUTPATIENT)
Dept: SURGERY | Age: 52
End: 2017-05-02

## 2017-05-02 ENCOUNTER — HOSPITAL ENCOUNTER (OUTPATIENT)
Dept: PREADMISSION TESTING | Age: 52
Discharge: HOME | End: 2017-05-02
Payer: COMMERCIAL

## 2017-05-02 ENCOUNTER — HOSPITAL ENCOUNTER (OUTPATIENT)
Dept: GENERAL RADIOLOGY | Age: 52
Discharge: HOME | End: 2017-05-02
Payer: COMMERCIAL

## 2017-05-02 VITALS
RESPIRATION RATE: 12 BRPM | TEMPERATURE: 98.5 F | DIASTOLIC BLOOD PRESSURE: 70 MMHG | HEIGHT: 64 IN | HEART RATE: 80 BPM | OXYGEN SATURATION: 99 % | WEIGHT: 167 LBS | BODY MASS INDEX: 28.51 KG/M2 | SYSTOLIC BLOOD PRESSURE: 110 MMHG

## 2017-05-02 DIAGNOSIS — N63.10 MASS OF RIGHT BREAST: Primary | ICD-10-CM

## 2017-05-02 DIAGNOSIS — N63.10 MASS OF RIGHT BREAST: ICD-10-CM

## 2017-05-02 LAB
ALBUMIN SERPL BCP-MCNC: 4.4 G/DL (ref 3.4–5)
ALBUMIN/GLOB SERPL: 1.3 {RATIO} (ref 0.8–1.7)
ALP SERPL-CCNC: 66 U/L (ref 45–117)
ALT SERPL-CCNC: 62 U/L (ref 13–56)
ANION GAP BLD CALC-SCNC: 6 MMOL/L (ref 3–18)
AST SERPL W P-5'-P-CCNC: 57 U/L (ref 15–37)
ATRIAL RATE: 74 BPM
BASOPHILS # BLD AUTO: 0 K/UL (ref 0–0.1)
BASOPHILS # BLD: 1 % (ref 0–2)
BILIRUB SERPL-MCNC: 0.8 MG/DL (ref 0.2–1)
BUN SERPL-MCNC: 6 MG/DL (ref 7–18)
BUN/CREAT SERPL: 7 (ref 12–20)
CALCIUM SERPL-MCNC: 9.7 MG/DL (ref 8.5–10.1)
CALCULATED P AXIS, ECG09: 56 DEGREES
CALCULATED R AXIS, ECG10: 72 DEGREES
CALCULATED T AXIS, ECG11: -110 DEGREES
CHLORIDE SERPL-SCNC: 100 MMOL/L (ref 100–108)
CO2 SERPL-SCNC: 34 MMOL/L (ref 21–32)
CREAT SERPL-MCNC: 0.83 MG/DL (ref 0.6–1.3)
DIAGNOSIS, 93000: NORMAL
DIFFERENTIAL METHOD BLD: ABNORMAL
EOSINOPHIL # BLD: 0.1 K/UL (ref 0–0.4)
EOSINOPHIL NFR BLD: 3 % (ref 0–5)
ERYTHROCYTE [DISTWIDTH] IN BLOOD BY AUTOMATED COUNT: 14.1 % (ref 11.6–14.5)
GLOBULIN SER CALC-MCNC: 3.3 G/DL (ref 2–4)
GLUCOSE SERPL-MCNC: 93 MG/DL (ref 74–99)
HCT VFR BLD AUTO: 36.3 % (ref 35–45)
HGB BLD-MCNC: 12.6 G/DL (ref 12–16)
LYMPHOCYTES # BLD AUTO: 56 % (ref 21–52)
LYMPHOCYTES # BLD: 2.5 K/UL (ref 0.9–3.6)
MCH RBC QN AUTO: 28.1 PG (ref 24–34)
MCHC RBC AUTO-ENTMCNC: 34.7 G/DL (ref 31–37)
MCV RBC AUTO: 80.8 FL (ref 74–97)
MONOCYTES # BLD: 0.2 K/UL (ref 0.05–1.2)
MONOCYTES NFR BLD AUTO: 4 % (ref 3–10)
NEUTS SEG # BLD: 1.6 K/UL (ref 1.8–8)
NEUTS SEG NFR BLD AUTO: 36 % (ref 40–73)
P-R INTERVAL, ECG05: 152 MS
PLATELET # BLD AUTO: 160 K/UL (ref 135–420)
PLATELET COMMENTS,PCOM: ABNORMAL
PMV BLD AUTO: 13.4 FL (ref 9.2–11.8)
POTASSIUM SERPL-SCNC: 3.1 MMOL/L (ref 3.5–5.5)
PROT SERPL-MCNC: 7.7 G/DL (ref 6.4–8.2)
Q-T INTERVAL, ECG07: 488 MS
QRS DURATION, ECG06: 92 MS
QTC CALCULATION (BEZET), ECG08: 541 MS
RBC # BLD AUTO: 4.49 M/UL (ref 4.2–5.3)
RBC MORPH BLD: ABNORMAL
SODIUM SERPL-SCNC: 140 MMOL/L (ref 136–145)
VENTRICULAR RATE, ECG03: 74 BPM
WBC # BLD AUTO: 4.4 K/UL (ref 4.6–13.2)

## 2017-05-02 PROCEDURE — 80053 COMPREHEN METABOLIC PANEL: CPT | Performed by: SURGERY

## 2017-05-02 PROCEDURE — 71010 XR CHEST SNGL V: CPT

## 2017-05-02 PROCEDURE — 71020 XR CHEST PA LAT: CPT

## 2017-05-02 PROCEDURE — 36415 COLL VENOUS BLD VENIPUNCTURE: CPT | Performed by: SURGERY

## 2017-05-02 PROCEDURE — 93005 ELECTROCARDIOGRAM TRACING: CPT

## 2017-05-02 PROCEDURE — 85025 COMPLETE CBC W/AUTO DIFF WBC: CPT | Performed by: SURGERY

## 2017-05-02 RX ORDER — SODIUM CHLORIDE 0.9 % (FLUSH) 0.9 %
5-10 SYRINGE (ML) INJECTION EVERY 8 HOURS
Status: CANCELLED | OUTPATIENT
Start: 2017-05-02

## 2017-05-02 RX ORDER — SODIUM CHLORIDE 0.9 % (FLUSH) 0.9 %
5-10 SYRINGE (ML) INJECTION AS NEEDED
Status: CANCELLED | OUTPATIENT
Start: 2017-05-02

## 2017-05-02 NOTE — PROGRESS NOTES
HISTORY OF PRESENT ILLNESS  Irving Joel is a 49 y.o. female. HPI    Review of Systems   Constitutional: Positive for weight loss. Negative for chills, diaphoresis, fever and malaise/fatigue. HENT: Negative. Eyes: Positive for blurred vision. Negative for double vision, photophobia, pain, discharge and redness. Respiratory: Negative. Cardiovascular: Negative. Gastrointestinal: Positive for constipation. Negative for abdominal pain, blood in stool, diarrhea, heartburn, melena, nausea and vomiting. Genitourinary: Negative. Musculoskeletal: Negative. Skin: Negative. Neurological: Negative for dizziness, tingling, tremors, sensory change, speech change, focal weakness, seizures, loss of consciousness and weakness. Endo/Heme/Allergies: Negative. Psychiatric/Behavioral: Negative.         Physical Exam

## 2017-05-02 NOTE — MR AVS SNAPSHOT
Visit Information Date & Time Provider Department Dept. Phone Encounter #  
 5/2/2017 10:00 AM MD Madeline Gaspar 33 [phone] [id number] Your Appointments 5/12/2017  2:00 PM  
POST OP with MD Madeline Gaspar 33 (Kristi June) Appt Note: Re: Post-Op / Right breast excisional biopsy / [address] [address] [id number]  
  
    
 6/21/2017  2:00 PM  
FOLLOW UP EXAM with Karis Pereira NP [facility] (Kristi June) Appt Note: 3 month f/u  
 [address] [facility] [address]  
[facility] [address] Upcoming Health Maintenance Date Due  
 FOOT EXAM Q1 5/28/1976 EYE EXAM RETINAL OR DILATED Q1 5/28/1976 DTaP/Tdap/Td series (1 - Tdap) 5/28/1987 MICROALBUMIN Q1 5/20/2017 LIPID PANEL Q1 5/20/2017 INFLUENZA AGE 9 TO ADULT 8/1/2017 HEMOGLOBIN A1C Q6M 9/21/2017 PAP AKA CERVICAL CYTOLOGY 6/17/2021 COLONOSCOPY 2/15/2027 Allergies as of 5/2/2017  Review Complete On: 5/2/2017 By: Leann Parkinson MD  
  
 Severity Noted Reaction Type Reactions Adhesive Tape-silicones  06/38/9734    Rash Morphine  09/22/2013    Itching Current Immunizations  Never Reviewed No immunizations on file. Not reviewed this visit You Were Diagnosed With   
  
 Codes Comments Mass of right breast    -  Primary ICD-10-CM: N63 
ICD-9-CM: 611.72 Vitals BP Pulse Temp Resp Height(growth percentile) Weight(growth percentile) 110/70 80 98.5 °F (36.9 °C) (Oral) 12 5' 4\" (1.626 m) 167 lb (75.8 kg) SpO2 BMI OB Status Smoking Status 99% 28.67 kg/m2 Medically Induced Never Smoker Vitals History BMI and BSA Data Body Mass Index Body Surface Area 28.67 kg/m 2 1.85 m 2 Preferred Pharmacy Pharmacy Name Phone [id number]- [address] [phone] Your Updated Medication List  
  
   
This list is accurate as of: 5/2/17 12:17 PM.  Always use your most recent med list.  
  
  
  
  
 albuterol 90 mcg/actuation inhaler Commonly known as:  PROVENTIL HFA, VENTOLIN HFA, PROAIR HFA Take 1 Puff by inhalation every four (4) hours as needed for Wheezing or Shortness of Breath. amLODIPine 5 mg tablet Commonly known as:  Antionette Gleason Take 1 Tab by mouth daily. atorvastatin 40 mg tablet Commonly known as:  LIPITOR Take 1 Tab by mouth daily. docusate sodium 100 mg capsule Commonly known as:  Everlina Simpler Take 1 Cap by mouth two (2) times a day. glimepiride 2 mg tablet Commonly known as:  AMARYL Take 1 Tab by mouth every morning. glucose blood VI test strips strip Commonly known as:  ASCENSIA AUTODISC VI, ONE TOUCH ULTRA TEST VI Check glucose daily in the AM  
  
 hydroCHLOROthiazide 25 mg tablet Commonly known as:  HYDRODIURIL Take 1 Tab by mouth daily. Lancets Misc Check glucose daily. One touch Ultra lancets. metFORMIN  mg tablet Commonly known as:  GLUCOPHAGE XR Take 1 Tab by mouth two (2) times daily (with meals). raNITIdine 150 mg tablet Commonly known as:  ZANTAC Take 1 Tab by mouth two (2) times a day. senna-docusate 8.6-50 mg per tablet Commonly known as:  DOC-Q-LAX Take 1 Tab by mouth daily. traMADol 50 mg tablet Commonly known as:  ULTRAM  
Take 1 Tab by mouth every six (6) hours as needed for Pain. Max Daily Amount: 200 mg. We Performed the Following SCHEDULE SURGERY [[id number] Custom] To-Do List   
 05/02/2017 Lab:  CBC WITH AUTOMATED DIFF   
  
 05/02/2017 ECG:  EKG, 12 LEAD, INITIAL   
  
 05/02/2017 Lab:  METABOLIC PANEL, COMPREHENSIVE   
  
 05/02/2017 Imaging:  XR CHEST PA LAT Please provide this summary of care documentation to your next provider. Your primary care clinician is listed as Juarez Samson. If you have any questions after today's visit, please call [phone].

## 2017-05-02 NOTE — PROGRESS NOTES
[facility]  General Surgery    Subjective:      HPI:  The patient is a very pleasant 50 yo female with a PMHx of DMII, htn, hypothyroidism, GERD, hyperlipidemia and right breast cancer. She is referred by the [facility] for evaluation and management of BIRADS IV masses of the right breast upper inner quadrant. She denies any unintentional weight loss, or skin changes of the chest/breasts.   I reviewed the US of the breasts independently on the monitor - there are two small <.5 cm solid masses of the upper inner quadrant of the right breast .      Patient Active Problem List    Diagnosis Date Noted    History of pituitary tumor 2016    History of breast cancer 2016    Mixed hyperlipidemia 2016    History of chemotherapy 2016    Gastroesophageal reflux disease without esophagitis 2016    Essential hypertension with goal blood pressure less than 130/80 2016    Type 2 diabetes mellitus without complication ([facility]     Subclinical hypothyroidism 2016     Past Medical History:   Diagnosis Date    Breast cancer ([facility]     right breast- chemo and radiation    Diabetes ([facility] 2016    GERD (gastroesophageal reflux disease)     H/O bilateral mastectomy     H/O seasonal allergies     H/O: pituitary tumor 2016    History of chemotherapy     Hyperlipidemia 2016    initial     Hypertension 2016    S/P radiation therapy [id number]    Subclinical hypothyroidism 2016    Thyroid nodule 2016    multiple- no suspicous concerns      Past Surgical History:   Procedure Laterality Date    COLONOSCOPY N/A 2/15/2017    COLONOSCOPY performed by Liz Abel MD at [address] HX GYN       x2    HX MASTECTOMY Bilateral     HX TUMOR REMOVAL  2016    pituitary tumor    IMPLANT BREAST SILICONE/EQ Bilateral 0796      Family History   Problem Relation Age of Onset    Hypertension Mother     Diabetes Mother     Hypertension Father     Diabetes Father     Cancer Neg Hx     Stroke Neg Hx     Heart Disease Neg Hx       Social History   Substance Use Topics    Smoking status: Never Smoker    Smokeless tobacco: Never Used    Alcohol use No      Allergies   Allergen Reactions    Adhesive Tape-Silicones Rash    Morphine Itching       Prior to Admission medications    Medication Sig Start Date End Date Taking? Authorizing Provider   hydroCHLOROthiazide (HYDRODIURIL) 25 mg tablet Take 1 Tab by mouth daily. 12/20/16  Yes Bevin Goldmann, NP   metFORMIN ER (GLUCOPHAGE XR) 500 mg tablet Take 1 Tab by mouth two (2) times daily (with meals). 12/20/16  Yes Bevin Goldmann, NP   atorvastatin (LIPITOR) 40 mg tablet Take 1 Tab by mouth daily. 12/20/16  Yes Bevin Goldmann, NP   amLODIPine (NORVASC) 5 mg tablet Take 1 Tab by mouth daily. 12/20/16  Yes Bevin Goldmann, NP   glimepiride (AMARYL) 2 mg tablet Take 1 Tab by mouth every morning. 12/20/16  Yes Bevin Goldmann, NP   raNITIdine (ZANTAC) 150 mg tablet Take 1 Tab by mouth two (2) times a day. 12/20/16  Yes Bevin Goldmann, NP   albuterol (PROVENTIL HFA, VENTOLIN HFA, PROAIR HFA) 90 mcg/actuation inhaler Take 1 Puff by inhalation every four (4) hours as needed for Wheezing or Shortness of Breath. 5/24/16  Yes Bevin Goldmann, NP   glucose blood VI test strips (ASCENSIA AUTODISC VI, ONE TOUCH ULTRA TEST VI) strip Check glucose daily in the AM 4/29/16  Yes Bevin Goldmann, NP   Lancets misc Check glucose daily. One touch Ultra lancets. 4/29/16  Yes Bevin Goldmann, NP   docusate sodium (COLACE) 100 mg capsule Take 1 Cap by mouth two (2) times a day. Patient taking differently: Take 100 mg by mouth two (2) times daily as needed. 1/26/17   Bevin Goldmann, NP   senna-docusate (DOC-Q-LAX) 8.6-50 mg per tablet Take 1 Tab by mouth daily. 1/26/17   Bevin Goldmann, NP   traMADol (ULTRAM) 50 mg tablet Take 1 Tab by mouth every six (6) hours as needed for Pain.  Max Daily Amount: 200 mg. 1/24/17   Anamaria Santiago PA-C       Review of Systems:    14 systems were reviewed. The results are as above in the HPI and otherwise negative. Objective:     Vitals:    05/02/17 1026   BP: 110/70   Pulse: 80   Resp: 12   Temp: 98.5 °F (36.9 °C)   TempSrc: Oral   SpO2: 99%   Weight: 75.8 kg (167 lb)   Height: 5' 4\" (1.626 m)       Physical Exam:  GENERAL: alert, cooperative, no distress, appears stated age,   EYE: conjunctivae/corneas clear. PERRL, EOM's intact. THROAT & NECK: normal and no erythema or exudates noted. ,    LYMPHATIC: Cervical, supraclavicular, and axillary nodes normal. ,   LUNG: clear to auscultation bilaterally,   HEART: regular rate and rhythm, S1, S2 normal, no murmur, click, rub or gallop,   BREASTS:   Left: No dimpling, discoloration, nipple inversion or retractions. No axillary or supraclavicular lymphadenopathy. No mass  Right: No dimpling, discoloration consistent with radiation burn is present in the most of the breast, nipple tattoo is present. No axillary or supraclavicular lymphadenopathy. 1 cm area of 2 palpable mass each approximately half a centimeter in the upper inner quadrant of the breast approximately 10 cm from the nipple  ABDOMEN: soft, non-tender. Bowel sounds normal. No masses,  no organomegaly,   EXTREMITIES:  extremities normal, atraumatic, no cyanosis or edema,   SKIN: Normal.,   NEUROLOGIC: AOx3. Cranial nerves 2-12 and sensation grossly intact. ,     Data Review:  to be done    Impression:     · Pt with history of right breast cancer s/p bilateral mastectomy with reconstruction with radiation and chemotherapy who presents with BIRADS 4 lesions of the right breast upper inner quadrant.      Plan:     · Excisional biopsy of right breast mass  · Consent on chart  · Preoperative orders written

## 2017-05-03 ENCOUNTER — ANESTHESIA EVENT (OUTPATIENT)
Dept: SURGERY | Age: 52
End: 2017-05-03
Payer: COMMERCIAL

## 2017-05-04 ENCOUNTER — HOSPITAL ENCOUNTER (OUTPATIENT)
Age: 52
Setting detail: OUTPATIENT SURGERY
Discharge: HOME | End: 2017-05-04
Attending: SURGERY | Admitting: SURGERY
Payer: COMMERCIAL

## 2017-05-04 ENCOUNTER — ANESTHESIA (OUTPATIENT)
Dept: SURGERY | Age: 52
End: 2017-05-04
Payer: COMMERCIAL

## 2017-05-04 VITALS
WEIGHT: 168.13 LBS | TEMPERATURE: 97.5 F | HEART RATE: 76 BPM | HEIGHT: 66 IN | RESPIRATION RATE: 18 BRPM | SYSTOLIC BLOOD PRESSURE: 139 MMHG | DIASTOLIC BLOOD PRESSURE: 87 MMHG | BODY MASS INDEX: 27.02 KG/M2 | OXYGEN SATURATION: 98 %

## 2017-05-04 LAB
BUN BLD-MCNC: 6 MG/DL (ref 7–18)
CHLORIDE BLD-SCNC: 98 MMOL/L (ref 100–108)
GLUCOSE BLD STRIP.AUTO-MCNC: 76 MG/DL (ref 70–110)
GLUCOSE BLD STRIP.AUTO-MCNC: 94 MG/DL (ref 74–106)
HCG UR QL: NEGATIVE
HCT VFR BLD CALC: 38 % (ref 36–49)
HGB BLD-MCNC: 12.9 G/DL (ref 12–16)
POTASSIUM BLD-SCNC: 3.1 MMOL/L (ref 3.5–5.5)
SODIUM BLD-SCNC: 140 MMOL/L (ref 136–145)

## 2017-05-04 PROCEDURE — 77030020782 HC GWN BAIR PAWS FLX 3M -B: Performed by: SURGERY

## 2017-05-04 PROCEDURE — 74011250636 HC RX REV CODE- 250/636

## 2017-05-04 PROCEDURE — 77030011640 HC PAD GRND REM COVD -A: Performed by: SURGERY

## 2017-05-04 PROCEDURE — 76210000016 HC OR PH I REC 1 TO 1.5 HR: Performed by: SURGERY

## 2017-05-04 PROCEDURE — 77030002933 HC SUT MCRYL J&J -A: Performed by: SURGERY

## 2017-05-04 PROCEDURE — 74011250636 HC RX REV CODE- 250/636: Performed by: NURSE ANESTHETIST, CERTIFIED REGISTERED

## 2017-05-04 PROCEDURE — 82947 ASSAY GLUCOSE BLOOD QUANT: CPT

## 2017-05-04 PROCEDURE — 74011000250 HC RX REV CODE- 250

## 2017-05-04 PROCEDURE — 74011250636 HC RX REV CODE- 250/636: Performed by: SURGERY

## 2017-05-04 PROCEDURE — 81025 URINE PREGNANCY TEST: CPT

## 2017-05-04 PROCEDURE — 74011250637 HC RX REV CODE- 250/637: Performed by: NURSE ANESTHETIST, CERTIFIED REGISTERED

## 2017-05-04 PROCEDURE — 76010000149 HC OR TIME 1 TO 1.5 HR: Performed by: SURGERY

## 2017-05-04 PROCEDURE — 82962 GLUCOSE BLOOD TEST: CPT

## 2017-05-04 PROCEDURE — 76210000021 HC REC RM PH II 0.5 TO 1 HR: Performed by: SURGERY

## 2017-05-04 PROCEDURE — 76060000033 HC ANESTHESIA 1 TO 1.5 HR: Performed by: SURGERY

## 2017-05-04 PROCEDURE — 77030010507 HC ADH SKN DERMBND J&J -B: Performed by: SURGERY

## 2017-05-04 PROCEDURE — 77030031139 HC SUT VCRL2 J&J -A: Performed by: SURGERY

## 2017-05-04 PROCEDURE — 74011250636 HC RX REV CODE- 250/636: Performed by: ANESTHESIOLOGY

## 2017-05-04 PROCEDURE — 88307 TISSUE EXAM BY PATHOLOGIST: CPT | Performed by: SURGERY

## 2017-05-04 PROCEDURE — 77030002996 HC SUT SLK J&J -A: Performed by: SURGERY

## 2017-05-04 PROCEDURE — 74011000250 HC RX REV CODE- 250: Performed by: SURGERY

## 2017-05-04 RX ORDER — CEFAZOLIN SODIUM 2 G/50ML
2 SOLUTION INTRAVENOUS ONCE
Status: COMPLETED | OUTPATIENT
Start: 2017-05-04 | End: 2017-05-04

## 2017-05-04 RX ORDER — SODIUM CHLORIDE, SODIUM LACTATE, POTASSIUM CHLORIDE, CALCIUM CHLORIDE 600; 310; 30; 20 MG/100ML; MG/100ML; MG/100ML; MG/100ML
75 INJECTION, SOLUTION INTRAVENOUS CONTINUOUS
Status: DISCONTINUED | OUTPATIENT
Start: 2017-05-04 | End: 2017-05-04 | Stop reason: HOSPADM

## 2017-05-04 RX ORDER — MIDAZOLAM HYDROCHLORIDE 1 MG/ML
INJECTION, SOLUTION INTRAMUSCULAR; INTRAVENOUS AS NEEDED
Status: DISCONTINUED | OUTPATIENT
Start: 2017-05-04 | End: 2017-05-04 | Stop reason: HOSPADM

## 2017-05-04 RX ORDER — SODIUM CHLORIDE 0.9 % (FLUSH) 0.9 %
5-10 SYRINGE (ML) INJECTION EVERY 8 HOURS
Status: DISCONTINUED | OUTPATIENT
Start: 2017-05-04 | End: 2017-05-04 | Stop reason: HOSPADM

## 2017-05-04 RX ORDER — FAMOTIDINE 20 MG/1
20 TABLET, FILM COATED ORAL ONCE
Status: COMPLETED | OUTPATIENT
Start: 2017-05-04 | End: 2017-05-04

## 2017-05-04 RX ORDER — POTASSIUM CHLORIDE 7.45 MG/ML
10 INJECTION INTRAVENOUS ONCE
Status: COMPLETED | OUTPATIENT
Start: 2017-05-04 | End: 2017-05-04

## 2017-05-04 RX ORDER — DIPHENHYDRAMINE HYDROCHLORIDE 50 MG/ML
12.5 INJECTION, SOLUTION INTRAMUSCULAR; INTRAVENOUS
Status: DISCONTINUED | OUTPATIENT
Start: 2017-05-04 | End: 2017-05-04 | Stop reason: HOSPADM

## 2017-05-04 RX ORDER — LIDOCAINE HYDROCHLORIDE 10 MG/ML
INJECTION, SOLUTION EPIDURAL; INFILTRATION; INTRACAUDAL; PERINEURAL AS NEEDED
Status: DISCONTINUED | OUTPATIENT
Start: 2017-05-04 | End: 2017-05-04 | Stop reason: HOSPADM

## 2017-05-04 RX ORDER — FENTANYL CITRATE 50 UG/ML
INJECTION, SOLUTION INTRAMUSCULAR; INTRAVENOUS AS NEEDED
Status: DISCONTINUED | OUTPATIENT
Start: 2017-05-04 | End: 2017-05-04 | Stop reason: HOSPADM

## 2017-05-04 RX ORDER — MAGNESIUM SULFATE 100 %
4 CRYSTALS MISCELLANEOUS AS NEEDED
Status: DISCONTINUED | OUTPATIENT
Start: 2017-05-04 | End: 2017-05-04 | Stop reason: HOSPADM

## 2017-05-04 RX ORDER — INSULIN LISPRO 100 [IU]/ML
INJECTION, SOLUTION INTRAVENOUS; SUBCUTANEOUS ONCE
Status: DISCONTINUED | OUTPATIENT
Start: 2017-05-04 | End: 2017-05-04 | Stop reason: HOSPADM

## 2017-05-04 RX ORDER — HYDROMORPHONE HYDROCHLORIDE 2 MG/ML
0.5 INJECTION, SOLUTION INTRAMUSCULAR; INTRAVENOUS; SUBCUTANEOUS
Status: DISCONTINUED | OUTPATIENT
Start: 2017-05-04 | End: 2017-05-04 | Stop reason: HOSPADM

## 2017-05-04 RX ORDER — LIDOCAINE HYDROCHLORIDE 20 MG/ML
INJECTION, SOLUTION EPIDURAL; INFILTRATION; INTRACAUDAL; PERINEURAL AS NEEDED
Status: DISCONTINUED | OUTPATIENT
Start: 2017-05-04 | End: 2017-05-04 | Stop reason: HOSPADM

## 2017-05-04 RX ORDER — IBUPROFEN 800 MG/1
800 TABLET ORAL
Qty: 40 TAB | Refills: 0 | Status: SHIPPED | OUTPATIENT
Start: 2017-05-04 | End: 2021-08-26

## 2017-05-04 RX ORDER — SODIUM CHLORIDE 0.9 % (FLUSH) 0.9 %
5-10 SYRINGE (ML) INJECTION AS NEEDED
Status: DISCONTINUED | OUTPATIENT
Start: 2017-05-04 | End: 2017-05-04 | Stop reason: HOSPADM

## 2017-05-04 RX ORDER — ONDANSETRON 2 MG/ML
4 INJECTION INTRAMUSCULAR; INTRAVENOUS AS NEEDED
Status: DISCONTINUED | OUTPATIENT
Start: 2017-05-04 | End: 2017-05-04 | Stop reason: HOSPADM

## 2017-05-04 RX ORDER — BUPIVACAINE HYDROCHLORIDE AND EPINEPHRINE 2.5; 5 MG/ML; UG/ML
INJECTION, SOLUTION EPIDURAL; INFILTRATION; INTRACAUDAL; PERINEURAL AS NEEDED
Status: DISCONTINUED | OUTPATIENT
Start: 2017-05-04 | End: 2017-05-04 | Stop reason: HOSPADM

## 2017-05-04 RX ORDER — DEXTROSE 50 % IN WATER (D50W) INTRAVENOUS SYRINGE
25-50 AS NEEDED
Status: DISCONTINUED | OUTPATIENT
Start: 2017-05-04 | End: 2017-05-04 | Stop reason: HOSPADM

## 2017-05-04 RX ORDER — OXYCODONE AND ACETAMINOPHEN 5; 325 MG/1; MG/1
1-2 TABLET ORAL
Qty: 40 TAB | Refills: 0 | Status: SHIPPED | OUTPATIENT
Start: 2017-05-04 | End: 2017-06-19

## 2017-05-04 RX ORDER — PROPOFOL 10 MG/ML
INJECTION, EMULSION INTRAVENOUS AS NEEDED
Status: DISCONTINUED | OUTPATIENT
Start: 2017-05-04 | End: 2017-05-04 | Stop reason: HOSPADM

## 2017-05-04 RX ADMIN — PROPOFOL 20 MG: 10 INJECTION, EMULSION INTRAVENOUS at 13:19

## 2017-05-04 RX ADMIN — PROPOFOL 30 MG: 10 INJECTION, EMULSION INTRAVENOUS at 13:13

## 2017-05-04 RX ADMIN — FENTANYL CITRATE 25 MCG: 50 INJECTION, SOLUTION INTRAMUSCULAR; INTRAVENOUS at 13:36

## 2017-05-04 RX ADMIN — CEFAZOLIN SODIUM 2 G: 2 SOLUTION INTRAVENOUS at 12:46

## 2017-05-04 RX ADMIN — PROPOFOL 30 MG: 10 INJECTION, EMULSION INTRAVENOUS at 13:00

## 2017-05-04 RX ADMIN — PROPOFOL 30 MG: 10 INJECTION, EMULSION INTRAVENOUS at 13:08

## 2017-05-04 RX ADMIN — FENTANYL CITRATE 50 MCG: 50 INJECTION, SOLUTION INTRAMUSCULAR; INTRAVENOUS at 13:00

## 2017-05-04 RX ADMIN — PROPOFOL 20 MG: 10 INJECTION, EMULSION INTRAVENOUS at 13:23

## 2017-05-04 RX ADMIN — PROPOFOL 20 MG: 10 INJECTION, EMULSION INTRAVENOUS at 13:36

## 2017-05-04 RX ADMIN — FAMOTIDINE 20 MG: 20 TABLET ORAL at 11:02

## 2017-05-04 RX ADMIN — FENTANYL CITRATE 25 MCG: 50 INJECTION, SOLUTION INTRAMUSCULAR; INTRAVENOUS at 13:20

## 2017-05-04 RX ADMIN — LIDOCAINE HYDROCHLORIDE 40 MG: 20 INJECTION, SOLUTION EPIDURAL; INFILTRATION; INTRACAUDAL; PERINEURAL at 13:00

## 2017-05-04 RX ADMIN — PROPOFOL 10 MG: 10 INJECTION, EMULSION INTRAVENOUS at 13:42

## 2017-05-04 RX ADMIN — HYDROMORPHONE HYDROCHLORIDE 0.5 MG: 2 INJECTION, SOLUTION INTRAMUSCULAR; INTRAVENOUS; SUBCUTANEOUS at 14:47

## 2017-05-04 RX ADMIN — PROPOFOL 30 MG: 10 INJECTION, EMULSION INTRAVENOUS at 13:04

## 2017-05-04 RX ADMIN — PROPOFOL 10 MG: 10 INJECTION, EMULSION INTRAVENOUS at 13:30

## 2017-05-04 RX ADMIN — SODIUM CHLORIDE, SODIUM LACTATE, POTASSIUM CHLORIDE, AND CALCIUM CHLORIDE 75 ML/HR: 600; 310; 30; 20 INJECTION, SOLUTION INTRAVENOUS at 11:02

## 2017-05-04 RX ADMIN — POTASSIUM CHLORIDE 10 MEQ: 10 INJECTION, SOLUTION INTRAVENOUS at 11:51

## 2017-05-04 RX ADMIN — MIDAZOLAM HYDROCHLORIDE 2 MG: 1 INJECTION, SOLUTION INTRAMUSCULAR; INTRAVENOUS at 12:46

## 2017-05-04 NOTE — ANESTHESIA PREPROCEDURE EVALUATION
Anesthetic History     PONV          Review of Systems / Medical History  Patient summary reviewed, nursing notes reviewed and pertinent labs reviewed    Pulmonary  Within defined limits                 Neuro/Psych   Within defined limits           Cardiovascular    Hypertension: well controlled                   GI/Hepatic/Renal     GERD           Endo/Other    Diabetes: type 2  Hypothyroidism       Other Findings   Comments:   Risk Factors for Postoperative nausea/vomiting:       History of postoperative nausea/vomiting? YES       Female? YES       Motion sickness? NO       Intended opioid administration for postoperative analgesia? YES      Smoking Abstinence  Current Smoker? NO  Elective Surgery? YES  Seen preoperatively by anesthesiologist or proxy prior to day of surgery? YES  Pt abstained from smoking 24 hours prior to anesthesia?  YES           Physical Exam    Airway  Mallampati: I  TM Distance: 4 - 6 cm  Neck ROM: normal range of motion   Mouth opening: Normal     Cardiovascular    Rhythm: regular  Rate: normal         Dental  No notable dental hx       Pulmonary  Breath sounds clear to auscultation               Abdominal  GI exam deferred       Other Findings            Anesthetic Plan    ASA: 3  Anesthesia type: general and MAC          Induction: Intravenous  Anesthetic plan and risks discussed with: Patient

## 2017-05-04 NOTE — INTERVAL H&P NOTE
H&P Update:  Julianne Yan was seen and examined. History and physical has been reviewed. The patient has been examined.  There have been no significant clinical changes since the completion of the originally dated History and Physical.    Signed By: Cal Kelsey MD     May 4, 2017 10:30 AM

## 2017-05-04 NOTE — OP NOTES
[place] [address] [address]                                 OPERATIVE REPORT    PATIENT:    Joie Wade  MRN:           [id number]   DATE:   2017  BILLIN  ROOM:       OR/PL  ATTENDING:   Gladis Alford MD  DICTATING:   Gladis Alford MD      Preoperative Dx: Right breast mass     Postoperative Dx: Same     Procedure: Excisional biopsy of right breast mass    Surgeon:  Nisa Varela MD    Assistant:  Huan Fregoso SA    Anesthesia:  Local - 1% lidocaine plain and .25% Marcaine with epinephrine    Findings:   Right breast mass    Specimen:  Right breast mass    EBL:10 mL    Fluid: 561 mL    Complications:  None    Brief Operative Indication:   Right breast mass    Description of operation :  Informed consent was obtained from the patient. Time out was performed to insure correct procedure. The patient was positioned supine on the table. The skin was prepped with Chlorhexidine and sterile drape applied. The local anesthetic was infiltrated into the skin and subcutaneous tissues. Once the tissues were numb,  The fifteen blade was used to create an incision 3 cm to excise the 3 cm L X 1 cm W mass. Electrocautery was used to excise the mass. The deepest layer of dissection was the pectoralis major muscle. The wound was cleaned with sterile saline. The deep dermal tissues were re approximated using 3-0 Vicryl suture with interrupted simple stitches. The skin was re approximated using 4-0 Monocryl suture in a running subcuticular closure. Dermabond was used as a wound sealant. She tolerated the procedure well. She was stable upon exiting the office.

## 2017-05-04 NOTE — DISCHARGE SUMMARY
4 Maurice Jin MD, [facility]  General Surgery  Discharge Summary     Patient ID:  Joie Wade  [id number]  49 y.o.  1965    Admit Date: 5/4/2017    Discharge Date: 5/4/2017    Admission Diagnoses: Mass of right breast [N63]    Discharge Diagnoses:    Problem List as of 5/4/2017  Date Reviewed: 5/2/2017          Codes Class Noted - Resolved    History of pituitary tumor ICD-10-CM: Z87.898  ICD-9-CM: V12.29  12/20/2016 - Present        History of breast cancer ICD-10-CM: Z85.3  ICD-9-CM: V10.3  5/31/2016 - Present        Mixed hyperlipidemia ICD-10-CM: E78.2  ICD-9-CM: 272.2  5/31/2016 - Present        History of chemotherapy ICD-10-CM: Z92.21  ICD-9-CM: V87.41  5/31/2016 - Present        Gastroesophageal reflux disease without esophagitis ICD-10-CM: K21.9  ICD-9-CM: 530.81  5/31/2016 - Present        Essential hypertension with goal blood pressure less than 130/80 ICD-10-CM: I10  ICD-9-CM: 401.9  5/5/2016 - Present        Type 2 diabetes mellitus without complication ([facility] [id number]: E11.9  ICD-9-CM: 250.00  5/5/2016 - Present        Subclinical hypothyroidism ICD-10-CM: E03.9  ICD-9-CM: 244.8  5/1/2016 - Present               Admission Condition: Good    Discharge Condition: Good    Last Procedure: Procedure(s):  RIGHT BREAST [facility] Course:   Normal hospital course for this procedure. Consults: None    Significant Diagnostic Studies: None    Disposition: home    Patient Instructions:   Current Discharge Medication List      START taking these medications    Details   oxyCODONE-acetaminophen (PERCOCET) 5-325 mg per tablet Take 1-2 Tabs by mouth every four (4) hours as needed for Pain. Max Daily Amount: 12 Tabs. Qty: 40 Tab, Refills: 0      ibuprofen (MOTRIN) 800 mg tablet Take 1 Tab by mouth three (3) times daily as needed for Pain.   Qty: 40 Tab, Refills: 0      !! docusate sodium (COLACE) 50 mg capsule Take 1 Cap by mouth two (2) times a day for 90 days. Qty: 60 Cap, Refills: 2       !! - Potential duplicate medications found. Please discuss with provider. CONTINUE these medications which have NOT CHANGED    Details   hydroCHLOROthiazide (HYDRODIURIL) 25 mg tablet Take 1 Tab by mouth daily. Qty: 90 Tab, Refills: 1    Associated Diagnoses: Essential hypertension with goal blood pressure less than 130/80      metFORMIN ER (GLUCOPHAGE XR) 500 mg tablet Take 1 Tab by mouth two (2) times daily (with meals). Qty: 180 Tab, Refills: 1    Associated Diagnoses: Type 2 diabetes mellitus without complication, without long-term current use of insulin (HCC)      atorvastatin (LIPITOR) 40 mg tablet Take 1 Tab by mouth daily. Qty: 90 Tab, Refills: 1    Associated Diagnoses: Mixed hyperlipidemia      amLODIPine (NORVASC) 5 mg tablet Take 1 Tab by mouth daily. Qty: 90 Tab, Refills: 1    Associated Diagnoses: Essential hypertension with goal blood pressure less than 130/80      glimepiride (AMARYL) 2 mg tablet Take 1 Tab by mouth every morning. Qty: 90 Tab, Refills: 1    Associated Diagnoses: Type 2 diabetes mellitus without complication, without long-term current use of insulin ([facility])      raNITIdine (ZANTAC) 150 mg tablet Take 1 Tab by mouth two (2) times a day. Qty: 180 Tab, Refills: 3    Associated Diagnoses: Gastroesophageal reflux disease without esophagitis      !! docusate sodium (COLACE) 100 mg capsule Take 1 Cap by mouth two (2) times a day. Qty: 60 Cap, Refills: 11    Associated Diagnoses: Constipation, unspecified constipation type      senna-docusate (DOC-Q-LAX) 8.6-50 mg per tablet Take 1 Tab by mouth daily. Qty: 30 Tab, Refills: 11    Associated Diagnoses: Constipation, unspecified constipation type      traMADol (ULTRAM) 50 mg tablet Take 1 Tab by mouth every six (6) hours as needed for Pain. Max Daily Amount: 200 mg.   Qty: 12 Tab, Refills: 0      albuterol (PROVENTIL HFA, VENTOLIN HFA, PROAIR HFA) 90 mcg/actuation inhaler Take 1 Puff by inhalation every four (4) hours as needed for Wheezing or Shortness of Breath. Qty: 1 Inhaler, Refills: 0    Associated Diagnoses: Cough      glucose blood VI test strips (ASCENSIA AUTODISC VI, ONE TOUCH ULTRA TEST VI) strip Check glucose daily in the AM  Qty: 50 Strip, Refills: 0    Associated Diagnoses: Type 2 diabetes mellitus with hyperglycemia ([facility])      Lancets misc Check glucose daily. One touch Ultra lancets. Qty: 50 Each, Refills: 0    Associated Diagnoses: Type 2 diabetes mellitus with hyperglycemia ([facility]       ! ! - Potential duplicate medications found. Please discuss with provider. Activity: See surgical instructions  Diet: Low fat, Low cholesterol  Wound Care: As directed    Follow-up with Dr. Suraj Garcia in 2 weeks.   Follow-up tests/labs None    Signed:  Keyur Walton MD  5/4/2017  1:53 PM

## 2017-05-04 NOTE — PERIOP NOTES
I have reviewed discharge instructions with the patient and daughter, Ilya Swift. The patient and daughter verbalized understanding. Patient armband removed and shredded.

## 2017-05-04 NOTE — H&P (VIEW-ONLY)
[facility]  General Surgery    Subjective:      HPI:  The patient is a very pleasant 48 yo female with a PMHx of DMII, htn, hypothyroidism, GERD, hyperlipidemia and right breast cancer. She is referred by the [facility] for evaluation and management of BIRADS IV masses of the right breast upper inner quadrant. She denies any unintentional weight loss, or skin changes of the chest/breasts.   I reviewed the US of the breasts independently on the monitor - there are two small <.5 cm solid masses of the upper inner quadrant of the right breast .      Patient Active Problem List    Diagnosis Date Noted    History of pituitary tumor 2016    History of breast cancer 2016    Mixed hyperlipidemia 2016    History of chemotherapy 2016    Gastroesophageal reflux disease without esophagitis 2016    Essential hypertension with goal blood pressure less than 130/80 2016    Type 2 diabetes mellitus without complication ([facility]     Subclinical hypothyroidism 2016     Past Medical History:   Diagnosis Date    Breast cancer ([facility]     right breast- chemo and radiation    Diabetes ([facility] 2016    GERD (gastroesophageal reflux disease)     H/O bilateral mastectomy     H/O seasonal allergies     H/O: pituitary tumor 2016    History of chemotherapy     Hyperlipidemia 2016    initial     Hypertension 2016    S/P radiation therapy [id number]    Subclinical hypothyroidism 2016    Thyroid nodule 2016    multiple- no suspicous concerns      Past Surgical History:   Procedure Laterality Date    COLONOSCOPY N/A 2/15/2017    COLONOSCOPY performed by Pita Burch MD at [address] HX GYN       x2    HX MASTECTOMY Bilateral     HX TUMOR REMOVAL  2016    pituitary tumor    IMPLANT BREAST SILICONE/EQ Bilateral 4427      Family History   Problem Relation Age of Onset    Hypertension Mother     Diabetes Mother     Hypertension Father     Diabetes Father     Cancer Neg Hx     Stroke Neg Hx     Heart Disease Neg Hx       Social History   Substance Use Topics    Smoking status: Never Smoker    Smokeless tobacco: Never Used    Alcohol use No      Allergies   Allergen Reactions    Adhesive Tape-Silicones Rash    Morphine Itching       Prior to Admission medications    Medication Sig Start Date End Date Taking? Authorizing Provider   hydroCHLOROthiazide (HYDRODIURIL) 25 mg tablet Take 1 Tab by mouth daily. 12/20/16  Yes Bevin Goldmann, NP   metFORMIN ER (GLUCOPHAGE XR) 500 mg tablet Take 1 Tab by mouth two (2) times daily (with meals). 12/20/16  Yes Bevin Goldmann, NP   atorvastatin (LIPITOR) 40 mg tablet Take 1 Tab by mouth daily. 12/20/16  Yes Bevin Goldmann, NP   amLODIPine (NORVASC) 5 mg tablet Take 1 Tab by mouth daily. 12/20/16  Yes Bevin Goldmann, NP   glimepiride (AMARYL) 2 mg tablet Take 1 Tab by mouth every morning. 12/20/16  Yes Bevin Goldmann, NP   raNITIdine (ZANTAC) 150 mg tablet Take 1 Tab by mouth two (2) times a day. 12/20/16  Yes Bevin Goldmann, NP   albuterol (PROVENTIL HFA, VENTOLIN HFA, PROAIR HFA) 90 mcg/actuation inhaler Take 1 Puff by inhalation every four (4) hours as needed for Wheezing or Shortness of Breath. 5/24/16  Yes Bevin Goldmann, NP   glucose blood VI test strips (ASCENSIA AUTODISC VI, ONE TOUCH ULTRA TEST VI) strip Check glucose daily in the AM 4/29/16  Yes Bevin Goldmann, NP   Lancets misc Check glucose daily. One touch Ultra lancets. 4/29/16  Yes Bevin Goldmann, NP   docusate sodium (COLACE) 100 mg capsule Take 1 Cap by mouth two (2) times a day. Patient taking differently: Take 100 mg by mouth two (2) times daily as needed. 1/26/17   Bevin Goldmann, NP   senna-docusate (DOC-Q-LAX) 8.6-50 mg per tablet Take 1 Tab by mouth daily. 1/26/17   Bevin Goldmann, NP   traMADol (ULTRAM) 50 mg tablet Take 1 Tab by mouth every six (6) hours as needed for Pain.  Max Daily Amount: 200 mg. 1/24/17   Fredy Villeda PA-C       Review of Systems:    14 systems were reviewed. The results are as above in the HPI and otherwise negative. Objective:     Vitals:    05/02/17 1026   BP: 110/70   Pulse: 80   Resp: 12   Temp: 98.5 °F (36.9 °C)   TempSrc: Oral   SpO2: 99%   Weight: 75.8 kg (167 lb)   Height: 5' 4\" (1.626 m)       Physical Exam:  GENERAL: alert, cooperative, no distress, appears stated age,   EYE: conjunctivae/corneas clear. PERRL, EOM's intact. THROAT & NECK: normal and no erythema or exudates noted. ,    LYMPHATIC: Cervical, supraclavicular, and axillary nodes normal. ,   LUNG: clear to auscultation bilaterally,   HEART: regular rate and rhythm, S1, S2 normal, no murmur, click, rub or gallop,   BREASTS:   Left: No dimpling, discoloration, nipple inversion or retractions. No axillary or supraclavicular lymphadenopathy. No mass  Right: No dimpling, discoloration consistent with radiation burn is present in the most of the breast, nipple tattoo is present. No axillary or supraclavicular lymphadenopathy. 1 cm area of 2 palpable mass each approximately half a centimeter in the upper inner quadrant of the breast approximately 10 cm from the nipple  ABDOMEN: soft, non-tender. Bowel sounds normal. No masses,  no organomegaly,   EXTREMITIES:  extremities normal, atraumatic, no cyanosis or edema,   SKIN: Normal.,   NEUROLOGIC: AOx3. Cranial nerves 2-12 and sensation grossly intact. ,     Data Review:  to be done    Impression:     · Pt with history of right breast cancer s/p bilateral mastectomy with reconstruction with radiation and chemotherapy who presents with BIRADS 4 lesions of the right breast upper inner quadrant.      Plan:     · Excisional biopsy of right breast mass  · Consent on chart  · Preoperative orders written

## 2017-05-04 NOTE — DISCHARGE INSTRUCTIONS
[facility]  Dony York MD, FACS  General Surgery    Pt may shower. Allow soap and water to run over the incision. No driving or operating heavy machinery while on narcotic pain medications. No strenuous activity or contact sports for two weeks. No lifting greater than 15 lbs for 2 weeks. Call MD for any redness, swelling, bleeding or pus at the incision. Also call for any nausea, vomiting, increased pain or pain uncontrolled by pain medicine. Open Breast Biopsy: What to Expect at Home  Your Recovery  An open breast biopsy is surgery to take a sample of breast tissue. A breast biopsy may be done to check a lump found during a breast exam. Or it may be done to check an area of concern found on a mammogram or ultrasound. The breast tissue will be sent to a lab, where a doctor will look at the tissue under a microscope to check for breast cancer. Your doctor may have some answers right away. But it can take up to 1 to 2 weeks to get the final results. Your doctor will discuss the results with you. For a few days after the surgery, you will probably feel tired and have some pain. The skin around the cut (incision) may feel firm, swollen, and tender. The area may be bruised. Tenderness usually goes away in a few days, and the bruising within 2 weeks. Firmness and swelling may take 3 to 6 months to go away. The stitches in your incision may dissolve on their own, or the doctor may take them out 7 to 10 days after surgery. This care sheet gives you a general idea about how long it will take for you to recover. But each person recovers at a different pace. Follow the steps below to get better as quickly as possible. How can you care for yourself at home? Activity  · Rest when you feel tired. Getting enough sleep will help you recover. · Try to walk each day. Start by walking a little more than you did the day before. Bit by bit, increase the amount you walk.  Walking boosts blood flow and helps prevent pneumonia and constipation. · For 2 weeks, avoid strenuous activities that put pressure on your chest or that involve vigorous movement of your upper body and arm on the side of the biopsy. Examples of these might include strenuous housework, holding an active child, jogging, or aerobic exercise. · For 2 weeks, avoid lifting anything that would make you strain. This may include heavy grocery bags and milk containers, a heavy briefcase or backpack, cat litter or dog food bags, a vacuum , or a child. · Ask your doctor when you can drive again. · You will probably need to take 1 or 2 days off from work. This depends on the type of work you do and how you feel. Diet  · You can eat your normal diet. If your stomach is upset, try bland, low-fat foods like plain rice, broiled chicken, toast, and yogurt. Medicines  · Your doctor will tell you if and when you can restart your medicines. He or she will also give you instructions about taking any new medicines. · If you take blood thinners, such as warfarin (Coumadin), clopidogrel (Plavix), or aspirin, be sure to talk to your doctor. He or she will tell you if and when to start taking those medicines again. Make sure that you understand exactly what your doctor wants you to do. · Be safe with medicines. Take pain medicines exactly as directed. ¨ If the doctor gave you a prescription medicine for pain, take it as prescribed. ¨ If you are not taking a prescription pain medicine, ask your doctor if you can take an over-the-counter medicine. · If you think your pain medicine is making you sick to your stomach:  ¨ Take your medicine after meals (unless your doctor has told you not to). ¨ Ask your doctor for a different pain medicine. · If your doctor prescribed antibiotics, take them as directed. Do not stop taking them just because you feel better. You need to take the full course of antibiotics.   Incision care  · If you have strips of tape on the incision, leave the tape on for a week or until it falls off. · Wash the area daily with warm, soapy water, and pat it dry. Don't use hydrogen peroxide or alcohol, which can slow healing. You may cover the area with a gauze bandage if it weeps or rubs against clothing. Change the bandage every day. · Keep the area clean and dry. Other instructions  · For the first 3 days after surgery, wear a supportive bra all the time, even at night. Follow-up care is a key part of your treatment and safety. Be sure to make and go to all appointments, and call your doctor if you are having problems. It's also a good idea to know your test results and keep a list of the medicines you take. When should you call for help? Call 911 anytime you think you may need emergency care. For example, call if:  · You passed out (lost consciousness). · You have severe trouble breathing. · You have sudden chest pain and shortness of breath, or you cough up blood. Call your doctor now or seek immediate medical care if:  · You are sick to your stomach or cannot keep fluids down. · You have pain that does not get better after you take pain medicine. · You have a fever over 100°F.  · You have signs of infection, such as:  ¨ Increased pain, swelling, warmth, or redness. ¨ Red streaks leading from the incision. ¨ Pus draining from the incision. ¨ Swollen lymph nodes in your neck, armpits, or groin. ¨ A fever. · You have loose stitches, or your incision comes open. · Bright red blood has soaked through the bandage over your incision. Watch closely for changes in your health, and be sure to contact your doctor if you have any problems. Where can you learn more? Go to http://kvng-koki.info/. Enter Y091 in the search box to learn more about \"Open Breast Biopsy: What to Expect at Home. \"  Current as of: July 26, 2016  Content Version: 11.2  © 0183-7907 [employer].  Care instructions adapted under license by "[employer] (which disclaims liability or warranty for this information). If you have questions about a medical condition or this instruction, always ask your healthcare professional. [address] any warranty or liability for your use of this information. DISCHARGE SUMMARY from Nurse    The following personal items are in your possession at time of discharge:    Dental Appliances: None  Visual Aid: Glasses     Home Medications: None  Jewelry: None  Clothing: Undergarments, Footwear, Socks, Shirt, Pants  Other Valuables: None     PATIENT INSTRUCTIONS:    After general anesthesia or intravenous sedation, for 24 hours or while taking prescription Narcotics:  · Limit your activities  · Do not drive and operate hazardous machinery  · Do not make important personal or business decisions  · Do  not drink alcoholic beverages  · If you have not urinated within 8 hours after discharge, please contact your surgeon on call. Report the following to your surgeon:  · Excessive pain, swelling, redness or odor of or around the surgical area  · Temperature over 100.5  · Nausea and vomiting lasting longer than 4 hours or if unable to take medications  · Any signs of decreased circulation or nerve impairment to extremity: change in color, persistent  numbness, tingling, coldness or increase pain  · Any questions    *  Please give a list of your current medications to your Primary Care Provider. *  Please update this list whenever your medications are discontinued, doses are      changed, or new medications (including over-the-counter products) are added. *  Please carry medication information at all times in case of emergency situations.     These are general instructions for a healthy lifestyle:    No smoking/ No tobacco products/ Avoid exposure to second hand smoke    Surgeon General's Warning:  Quitting smoking now greatly reduces serious risk to your health. Obesity, smoking, and sedentary lifestyle greatly increases your risk for illness    A healthy diet, regular physical exercise & weight monitoring are important for maintaining a healthy lifestyle    You may be retaining fluid if you have a history of heart failure or if you experience any of the following symptoms:  Weight gain of 3 pounds or more overnight or 5 pounds in a week, increased swelling in our hands or feet or shortness of breath while lying flat in bed. Please call your doctor as soon as you notice any of these symptoms; do not wait until your next office visit. Recognize signs and symptoms of STROKE:    F-face looks uneven    A-arms unable to move or move unevenly    S-speech slurred or non-existent    T-time-call 911 as soon as signs and symptoms begin-DO NOT go       Back to bed or wait to see if you get better-TIME IS BRAIN. Warning Signs of HEART ATTACK     Call 911 if you have these symptoms:   Chest discomfort. Most heart attacks involve discomfort in the center of the chest that lasts more than a few minutes, or that goes away and comes back. It can feel like uncomfortable pressure, squeezing, fullness, or pain.  Discomfort in other areas of the upper body. Symptoms can include pain or discomfort in one or both arms, the back, neck, jaw, or stomach.  Shortness of breath with or without chest discomfort.  Other signs may include breaking out in a cold sweat, nausea, or lightheadedness. Don't wait more than five minutes to call 911 - MINUTES MATTER! Fast action can save your life. Calling 911 is almost always the fastest way to get lifesaving treatment. Emergency Medical Services staff can begin treatment when they arrive -- up to an hour sooner than if someone gets to the hospital by car. The discharge information has been reviewed with the patient and daughter. The patient and daughter verbalized understanding.     Discharge medications reviewed with the patient and daughter and appropriate educational materials and side effects teaching were provided. Narcotic-Analgesic/Acetaminophen (Percocet, Norco, Lorcet HD, Lortab 10/325) - (By mouth)   Why this medicine is used:   Relieves pain. Contact a nurse or doctor right away if you have:  · Extreme weakness, shallow breathing, slow heartbeat  · Severe confusion, lightheadedness, dizziness, fainting  · Yellow skin or eyes, dark urine or pale stools  · Severe constipation, severe stomach pain, nausea, vomiting, loss of appetite  · Sweating or cold, clammy skin     Common side effects:  · Mild constipation, nausea, vomiting  · Sleepiness, tiredness  · Itching, rash  © 2017 [facility] Information is for End User's use only and may not be sold, redistributed or otherwise used for commercial purposes. Ibuprofen (Advil, Advil Children's, Motrin, Children's Ibuprofen) - (By mouth)   Why this medicine is used:   Treats pain and fever. This medicine is an NSAID. Contact a nurse or doctor right away if you have:  · Change in how much or how often you urinate  · Severe stomach pain, vomiting blood, bloody or black tarry stools  · Swelling in your hands, ankles, or feet; rapid weight gain     Common side effects:  · Constipation, diarrhea, gas, mild upset stomach  · Ringing in your ears, dizziness, headache  © 2017 [address] is for End User's use only and may not be sold, redistributed or otherwise used for commercial purposes.

## 2017-05-04 NOTE — ANESTHESIA POSTPROCEDURE EVALUATION
Post-Anesthesia Evaluation and Assessment    Patient: Steve Munoz MRN: [id number]  SSN: [id number]    YOB: 1965  Age: 49 y.o. Sex: female       Cardiovascular Function/Vital Signs  Visit Vitals    /87 (BP 1 Location: Left arm, BP Patient Position: At rest)    Pulse 76    Temp 36.4 °C (97.5 °F)    Resp 18    Ht 5' 6\" (1.676 m)    Wt 76.3 kg (168 lb 2 oz)    SpO2 98%    BMI 27.14 kg/m2       Patient is status post general, MAC anesthesia for Procedure(s):  RIGHT BREAST EXCIONAL BIOPSY. Nausea/Vomiting: None    Postoperative hydration reviewed and adequate. Pain:  Pain Scale 1: Numeric (0 - 10) (05/04/17 1512)  Pain Intensity 1: 0 (05/04/17 1512)   Managed    Neurological Status:   Neuro (WDL): Within Defined Limits (05/04/17 1356)   At baseline    Mental Status and Level of Consciousness: Arousable    Pulmonary Status:   O2 Device: Room air (05/04/17 1512)   Adequate oxygenation and airway patent    Complications related to anesthesia: None    Post-anesthesia assessment completed.  No concerns    Signed By: Nilam Echevarria MD     May 4, 2017

## 2017-05-04 NOTE — IP AVS SNAPSHOT
Kristy Caron 
 
 
 [address] [facility] [address] 
[phone] Patient: Romi Galloway MRN: [id number] :[id number] You are allergic to the following Allergen Reactions Adhesive Tape-Silicones Rash Morphine Itching Recent Documentation Height Weight BMI OB Status Smoking Status 1.676 m 76.3 kg 27.14 kg/m2 Medically Induced Never Smoker Emergency Contacts Name Discharge Info Relation Home Work Mobile  [address] CAREGIVER [3] Son [22] [phone] [facility] DISCHARGE CAREGIVER [3] Daughter [21] [phone] Author Aaron  Child [2] [phone] About your hospitalization You were admitted on: May 4, 2017 You last received care in the:  [facility] PAC[facility] You were discharged on: May 4, 2017 Unit phone number:  [phone] Why you were hospitalized Your primary diagnosis was:  Not on File Providers Seen During Your Hospitalizations Provider Role Specialty Primary office phone Shanell Brooke MD Attending Provider General Surgery [phone] Your Primary Care Physician (PCP) Primary Care Physician Office Phone Office Fax Nereida Dates [phone] Follow-up Information Follow up With Details Comments Contact Info Kelton Linares NP   [address] [facility] [address] [facility] [address] [phone] Shanell Brooke MD Follow up in 1 week(s)  [address] [facility] [address] [facility] 
[phone] Your Appointments Friday May 12, 2017  2:00 PM EDT  
POST OP with MD ROOPA Galindo [facility] ([address]) [address] [place] [address] [address] [facility]  
[phone] Current Discharge Medication List  
  
START taking these medications Dose & Instructions Dispensing Information Comments Morning Noon Evening Bedtime  
 ibuprofen 800 mg tablet Commonly known as:  MOTRIN Your last dose was: Your next dose is:    
   
   
 Dose:  800 mg Take 1 Tab by mouth three (3) times daily as needed for Pain. Quantity:  40 Tab Refills:  0  
     
   
   
   
  
 oxyCODONE-acetaminophen 5-325 mg per tablet Commonly known as:  PERCOCET Your last dose was: Your next dose is:    
   
   
 Dose:  1-2 Tab Take 1-2 Tabs by mouth every four (4) hours as needed for Pain. Max Daily Amount: 12 Tabs. Quantity:  40 Tab Refills:  0 CONTINUE these medications which have CHANGED Dose & Instructions Dispensing Information Comments Morning Noon Evening Bedtime * docusate sodium 100 mg capsule Commonly known as:  Evalee Dinning What changed:   
- when to take this 
- reasons to take this Your last dose was: Your next dose is:    
   
   
 Dose:  100 mg Take 1 Cap by mouth two (2) times a day. Quantity:  60 Cap Refills:  11  
     
   
   
   
  
 * docusate sodium 50 mg capsule Commonly known as:  Evalee Dinning What changed: You were already taking a medication with the same name, and this prescription was added. Make sure you understand how and when to take each. Your last dose was: Your next dose is:    
   
   
 Dose:  50 mg Take 1 Cap by mouth two (2) times a day for 90 days. Quantity:  60 Cap Refills:  2  
     
   
   
   
  
 * Notice: This list has 2 medication(s) that are the same as other medications prescribed for you. Read the directions carefully, and ask your doctor or other care provider to review them with you. CONTINUE these medications which have NOT CHANGED Dose & Instructions Dispensing Information Comments Morning Noon Evening Bedtime  
 albuterol 90 mcg/actuation inhaler Commonly known as:  PROVENTIL HFA, VENTOLIN HFA, PROAIR HFA Your last dose was: Your next dose is:    
   
   
 Dose:  1 Puff Take 1 Puff by inhalation every four (4) hours as needed for Wheezing or Shortness of Breath. Quantity:  1 Inhaler Refills:  0  
     
   
   
   
  
 amLODIPine 5 mg tablet Commonly known as:  [place] Your last dose was: Your next dose is:    
   
   
 Dose:  5 mg Take 1 Tab by mouth daily. Quantity:  90 Tab Refills:  1  
     
   
   
   
  
 atorvastatin 40 mg tablet Commonly known as:  LIPITOR Your last dose was: Your next dose is:    
   
   
 Dose:  40 mg Take 1 Tab by mouth daily. Quantity:  90 Tab Refills:  1  
     
   
   
   
  
 glimepiride 2 mg tablet Commonly known as:  AMARYL Your last dose was: Your next dose is:    
   
   
 Dose:  2 mg Take 1 Tab by mouth every morning. Quantity:  90 Tab Refills:  1  
     
   
   
   
  
 glucose blood VI test strips strip Commonly known as:  ASCENSIA AUTODISC VI, ONE TOUCH ULTRA TEST VI Your last dose was: Your next dose is:    
   
   
 Check glucose daily in the AM  
 Quantity:  50 Strip Refills:  0  
     
   
   
   
  
 hydroCHLOROthiazide 25 mg tablet Commonly known as:  HYDRODIURIL Your last dose was: Your next dose is:    
   
   
 Dose:  25 mg Take 1 Tab by mouth daily. Quantity:  90 Tab Refills:  1 Lancets Misc Your last dose was: Your next dose is:    
   
   
 Check glucose daily. One touch Ultra lancets. Quantity:  50 Each Refills:  0  
     
   
   
   
  
 metFORMIN  mg tablet Commonly known as:  GLUCOPHAGE XR Your last dose was: Your next dose is:    
   
   
 Dose:  500 mg Take 1 Tab by mouth two (2) times daily (with meals). Quantity:  180 Tab Refills:  1  
     
   
   
   
  
 raNITIdine 150 mg tablet Commonly known as:  ZANTAC Your last dose was:     
   
Your next dose is:    
   
   
 Dose:  150 mg  
 Take 1 Tab by mouth two (2) times a day. Quantity:  180 Tab Refills:  3  
     
   
   
   
  
 senna-docusate 8.6-50 mg per tablet Commonly known as:  DOC-Q-LAX Your last dose was: Your next dose is:    
   
   
 Dose:  1 Tab Take 1 Tab by mouth daily. Quantity:  30 Tab Refills:  11  
     
   
   
   
  
 traMADol 50 mg tablet Commonly known as:  ULTRAM  
   
Your last dose was: Your next dose is:    
   
   
 Dose:  50 mg Take 1 Tab by mouth every six (6) hours as needed for Pain. Max Daily Amount: 200 mg. Quantity:  12 Tab Refills:  0 Where to Get Your Medications These medications were sent to [facility]/pharmacy #[id number]- [address]  [facility] [address] ([address]), [address]  [address] Phone:  [phone]  
  docusate sodium 50 mg capsule  
 ibuprofen 800 mg tablet Information on where to get these meds will be given to you by the nurse or doctor. ! Ask your nurse or doctor about these medications  
  oxyCODONE-acetaminophen 5-325 mg per tablet Discharge Instructions [facility] Kelli Maldonado MD, FACS General Surgery Pt may shower. Allow soap and water to run over the incision. No driving or operating heavy machinery while on narcotic pain medications. No strenuous activity or contact sports for two weeks. No lifting greater than 15 lbs for 2 weeks. Call MD for any redness, swelling, bleeding or pus at the incision. Also call for any nausea, vomiting, increased pain or pain uncontrolled by pain medicine. Open Breast Biopsy: What to Expect at Home Your Recovery An open breast biopsy is surgery to take a sample of breast tissue.  A breast biopsy may be done to check a lump found during a breast exam. Or it may be done to check an area of concern found on a mammogram or ultrasound. The breast tissue will be sent to a lab, where a doctor will look at the tissue under a microscope to check for breast cancer. Your doctor may have some answers right away. But it can take up to 1 to 2 weeks to get the final results. Your doctor will discuss the results with you. For a few days after the surgery, you will probably feel tired and have some pain. The skin around the cut (incision) may feel firm, swollen, and tender. The area may be bruised. Tenderness usually goes away in a few days, and the bruising within 2 weeks. Firmness and swelling may take 3 to 6 months to go away. The stitches in your incision may dissolve on their own, or the doctor may take them out 7 to 10 days after surgery. This care sheet gives you a general idea about how long it will take for you to recover. But each person recovers at a different pace. Follow the steps below to get better as quickly as possible. How can you care for yourself at home? Activity · Rest when you feel tired. Getting enough sleep will help you recover. · Try to walk each day. Start by walking a little more than you did the day before. Bit by bit, increase the amount you walk. Walking boosts blood flow and helps prevent pneumonia and constipation. · For 2 weeks, avoid strenuous activities that put pressure on your chest or that involve vigorous movement of your upper body and arm on the side of the biopsy. Examples of these might include strenuous housework, holding an active child, jogging, or aerobic exercise. · For 2 weeks, avoid lifting anything that would make you strain. This may include heavy grocery bags and milk containers, a heavy briefcase or backpack, cat litter or dog food bags, a vacuum , or a child. · Ask your doctor when you can drive again. · You will probably need to take 1 or 2 days off from work. This depends on the type of work you do and how you feel. Diet · You can eat your normal diet. If your stomach is upset, try bland, low-fat foods like plain rice, broiled chicken, toast, and yogurt. Medicines · Your doctor will tell you if and when you can restart your medicines. He or she will also give you instructions about taking any new medicines. · If you take blood thinners, such as warfarin (Coumadin), clopidogrel (Plavix), or aspirin, be sure to talk to your doctor. He or she will tell you if and when to start taking those medicines again. Make sure that you understand exactly what your doctor wants you to do. · Be safe with medicines. Take pain medicines exactly as directed. ¨ If the doctor gave you a prescription medicine for pain, take it as prescribed. ¨ If you are not taking a prescription pain medicine, ask your doctor if you can take an over-the-counter medicine. · If you think your pain medicine is making you sick to your stomach: 
¨ Take your medicine after meals (unless your doctor has told you not to). ¨ Ask your doctor for a different pain medicine. · If your doctor prescribed antibiotics, take them as directed. Do not stop taking them just because you feel better. You need to take the full course of antibiotics. Incision care · If you have strips of tape on the incision, leave the tape on for a week or until it falls off. · Wash the area daily with warm, soapy water, and pat it dry. Don't use hydrogen peroxide or alcohol, which can slow healing. You may cover the area with a gauze bandage if it weeps or rubs against clothing. Change the bandage every day. · Keep the area clean and dry. Other instructions · For the first 3 days after surgery, wear a supportive bra all the time, even at night. Follow-up care is a key part of your treatment and safety. Be sure to make and go to all appointments, and call your doctor if you are having problems. It's also a good idea to know your test results and keep a list of the medicines you take. When should you call for help? Call 911 anytime you think you may need emergency care. For example, call if: 
· You passed out (lost consciousness). · You have severe trouble breathing. · You have sudden chest pain and shortness of breath, or you cough up blood. Call your doctor now or seek immediate medical care if: 
· You are sick to your stomach or cannot keep fluids down. · You have pain that does not get better after you take pain medicine. · You have a fever over 100°F. 
· You have signs of infection, such as: 
¨ Increased pain, swelling, warmth, or redness. ¨ Red streaks leading from the incision. ¨ Pus draining from the incision. ¨ Swollen lymph nodes in your neck, armpits, or groin. ¨ A fever. · You have loose stitches, or your incision comes open. · Bright red blood has soaked through the bandage over your incision. Watch closely for changes in your health, and be sure to contact your doctor if you have any problems. Where can you learn more? Go to http://kvng-koki.info/. Enter R821 in the search box to learn more about \"Open Breast Biopsy: What to Expect at Home. \" Current as of: July 26, 2016 Content Version: 11.2 © 4648-2442 [employer]. Care instructions adapted under license by [employer] (which disclaims liability or warranty for this information). If you have questions about a medical condition or this instruction, always ask your healthcare professional. [place] [address] any warranty or liability for your use of this information. DISCHARGE SUMMARY from Nurse The following personal items are in your possession at time of discharge: 
 
Dental Appliances: None Visual Aid: Glasses Home Medications: None Jewelry: None Clothing: Undergarments, Footwear, Socks, Shirt, Pants Other Valuables: None PATIENT INSTRUCTIONS: 
 
 
F-face looks uneven A-arms unable to move or move unevenly S-speech slurred or non-existent T-time-call 911 as soon as signs and symptoms begin-DO NOT go Back to bed or wait to see if you get better-TIME IS BRAIN. Warning Signs of HEART ATTACK Call 911 if you have these symptoms: 
? Chest discomfort. Most heart attacks involve discomfort in the center of the chest that lasts more than a few minutes, or that goes away and comes back. It can feel like uncomfortable pressure, squeezing, fullness, or pain. ? Discomfort in other areas of the upper body. Symptoms can include pain or discomfort in one or both arms, the back, neck, jaw, or stomach. ? Shortness of breath with or without chest discomfort. ? Other signs may include breaking out in a cold sweat, nausea, or lightheadedness. Don't wait more than five minutes to call [address]! Fast action can save your life. Calling 911 is almost always the fastest way to get lifesaving treatment. Emergency Medical Services staff can begin treatment when they arrive  up to an hour sooner than if someone gets to the hospital by car. The discharge information has been reviewed with the patient and daughter. The patient and daughter verbalized understanding. Discharge medications reviewed with the patient and daughter and appropriate educational materials and side effects teaching were provided. Narcotic-Analgesic/Acetaminophen (Percocet, Norco, Lorcet HD, Lortab 10/325) - (By mouth) Why this medicine is used:  
Relieves pain. Contact a nurse or doctor right away if you have: 
· Extreme weakness, shallow breathing, slow heartbeat · Severe confusion, lightheadedness, dizziness, fainting · Yellow skin or eyes, dark urine or pale stools · Severe constipation, severe stomach pain, nausea, vomiting, loss of appetite · Sweating or cold, clammy skin Common side effects: · Mild constipation, nausea, vomiting · Sleepiness, tiredness · Itching, rash © 2017 [address] Information is for End User's use only and may not be sold, redistributed or otherwise used for commercial purposes. Ibuprofen (Advil, Advil Children's, Motrin, Children's Ibuprofen) - (By mouth) Why this medicine is used:  
Treats pain and fever. This medicine is an NSAID. Contact a nurse or doctor right away if you have: 
· Change in how much or how often you urinate · Severe stomach pain, vomiting blood, bloody or black tarry stools · Swelling in your hands, ankles, or feet; rapid weight gain Common side effects: 
· Constipation, diarrhea, gas, mild upset stomach · Ringing in your ears, dizziness, headache © 2017 [address] Information is for End User's use only and may not be sold, redistributed or otherwise used for commercial purposes. Discharge Orders None Intrinsity Announcement We are excited to announce that we are making your provider's discharge notes available to you in Intrinsity. You will see these notes when they are completed and signed by the physician that discharged you from your recent hospital stay. If you have any questions or concerns about any information you see in Intrinsity, please call the Health Information Department where you were seen or reach out to your Primary Care Provider for more information about your plan of care. General Information Please provide this summary of care documentation to your next provider. Patient Signature:  ____________________________________________________________ Date:  ____________________________________________________________  
  
Noemi Pacheco Provider Signature:  ____________________________________________________________ Date:  ____________________________________________________________

## 2017-05-12 ENCOUNTER — OFFICE VISIT (OUTPATIENT)
Dept: SURGERY | Age: 52
End: 2017-05-12

## 2017-05-12 VITALS
TEMPERATURE: 98.2 F | RESPIRATION RATE: 16 BRPM | WEIGHT: 166 LBS | BODY MASS INDEX: 28.34 KG/M2 | DIASTOLIC BLOOD PRESSURE: 80 MMHG | HEART RATE: 85 BPM | HEIGHT: 64 IN | SYSTOLIC BLOOD PRESSURE: 100 MMHG

## 2017-05-12 DIAGNOSIS — N64.1 FAT NECROSIS OF BREAST: ICD-10-CM

## 2017-05-12 DIAGNOSIS — N63.10 MASS OF RIGHT BREAST: ICD-10-CM

## 2017-05-12 DIAGNOSIS — R92.0 MICROCALCIFICATIONS OF THE BREAST: Primary | ICD-10-CM

## 2017-05-12 RX ORDER — MAGNESIUM CITRATE
296 SOLUTION, ORAL ORAL
Qty: 3 BOTTLE | Refills: 0 | Status: SHIPPED | OUTPATIENT
Start: 2017-05-12 | End: 2017-05-12

## 2017-05-27 ENCOUNTER — TELEPHONE (OUTPATIENT)
Dept: FAMILY MEDICINE CLINIC | Age: 52
End: 2017-05-27

## 2017-05-27 NOTE — TELEPHONE ENCOUNTER
Returned patient phone call for refill for cyclobenzaprine   Provider advised that medication reconciliation needed to be checked and provider away from home but will check and call back regarding status of her request.  Upon checking her medications cyclobenzaprine was discontinued by Anamaria AGUIAR with discontinuation reason listed as alternate therapy. This was discontinued on 1-24-17. Patient telephoned back at 1 pm and advised that medication was discontinued and she would need to discuss with her PCP. Patient verbalizes understanding.    Sulema RODRIGUEZ

## 2017-05-30 DIAGNOSIS — Z87.898 HISTORY OF PITUITARY TUMOR: ICD-10-CM

## 2017-05-30 DIAGNOSIS — M62.838 NECK MUSCLE SPASM: ICD-10-CM

## 2017-05-30 NOTE — TELEPHONE ENCOUNTER
Patient called requesting a refill on her muscle relaxer.  I didn't see the medication on her medication lists

## 2017-05-31 NOTE — TELEPHONE ENCOUNTER
Pt returned call to advise that the medication was Cyclobenzaprine. I advised pt that this medication was d/c by Lele Hammond PA-C due to alternate therapy. Pt states she is not taking a alternate therapy and she sometimes still have the neck spasms. I advised pt that I would send request to Antoni Hill NP.  Pt verbalized understanding

## 2017-06-01 RX ORDER — METHOCARBAMOL 500 MG/1
500 TABLET, FILM COATED ORAL
Qty: 30 TAB | Refills: 0 | Status: SHIPPED | OUTPATIENT
Start: 2017-06-01 | End: 2017-06-09 | Stop reason: ALTCHOICE

## 2017-06-01 NOTE — TELEPHONE ENCOUNTER
6/1/2017  4:49 PM    Chief Complaint   Patient presents with    Medication Refill       Noted request for refill on Flexeril. Contraindication between Flexeril and Tramadol. Alternative therapy was ordered which was Robaxin. Refilled Robaxin at this time for muscle relaxer usage. Forwarded to clinical staff to make patient aware. Upcoming appointment with me in a few weeks.

## 2017-06-02 NOTE — TELEPHONE ENCOUNTER
Call made to Pt using two identifiers. Noted that pt requested a refill Flexeril. She was made aware that  Per the provider this is a contraindication with the Tramadol and was given Robaxin at last visit in place of the Flexeril. The provider sent in a refill for the Robaxin 6/1/17. Pt verbalized understanding and will call the pharmacy for .

## 2017-06-08 ENCOUNTER — TELEPHONE (OUTPATIENT)
Dept: FAMILY MEDICINE CLINIC | Age: 52
End: 2017-06-08

## 2017-06-09 RX ORDER — CYCLOBENZAPRINE HCL 5 MG
5 TABLET ORAL
Qty: 90 TAB | Refills: 1 | Status: SHIPPED | OUTPATIENT
Start: 2017-06-09 | End: 2019-05-22

## 2017-06-09 NOTE — TELEPHONE ENCOUNTER
2017   1:10 PM    Chief Complaint   Patient presents with    Medication Problem       Called patient back at this time- verified by name and . She reports that the medication given for her neck that started with an M is not helping. I recently sent Methocarbamol to pharmacy for patient. She confirms this is it- not helping with her neck pain. Previously, this was sent in replacement of Flexeril because Tramadol was noted to be prescribed. Today she states that she is not taking Tramadol and has not for several months. Sent in Rx at this time for Flexeril.

## 2017-06-18 PROCEDURE — 96361 HYDRATE IV INFUSION ADD-ON: CPT

## 2017-06-18 PROCEDURE — 99285 EMERGENCY DEPT VISIT HI MDM: CPT

## 2017-06-18 PROCEDURE — 96374 THER/PROPH/DIAG INJ IV PUSH: CPT

## 2017-06-19 ENCOUNTER — HOSPITAL ENCOUNTER (EMERGENCY)
Age: 52
Discharge: HOME | End: 2017-06-19
Attending: EMERGENCY MEDICINE | Admitting: EMERGENCY MEDICINE
Payer: COMMERCIAL

## 2017-06-19 ENCOUNTER — APPOINTMENT (OUTPATIENT)
Dept: CT IMAGING | Age: 52
End: 2017-06-19
Attending: PHYSICIAN ASSISTANT
Payer: COMMERCIAL

## 2017-06-19 VITALS
HEART RATE: 74 BPM | RESPIRATION RATE: 9 BRPM | WEIGHT: 165 LBS | OXYGEN SATURATION: 98 % | DIASTOLIC BLOOD PRESSURE: 83 MMHG | SYSTOLIC BLOOD PRESSURE: 113 MMHG | BODY MASS INDEX: 28.32 KG/M2 | TEMPERATURE: 97.3 F

## 2017-06-19 DIAGNOSIS — E87.6 HYPOKALEMIA: Primary | ICD-10-CM

## 2017-06-19 DIAGNOSIS — N39.0 URINARY TRACT INFECTION WITHOUT HEMATURIA, SITE UNSPECIFIED: ICD-10-CM

## 2017-06-19 DIAGNOSIS — K52.9 COLITIS: ICD-10-CM

## 2017-06-19 DIAGNOSIS — R10.30 LOWER ABDOMINAL PAIN: ICD-10-CM

## 2017-06-19 LAB
ALBUMIN SERPL BCP-MCNC: 4.4 G/DL (ref 3.4–5)
ALBUMIN/GLOB SERPL: 1.3 {RATIO} (ref 0.8–1.7)
ALP SERPL-CCNC: 79 U/L (ref 45–117)
ALT SERPL-CCNC: 45 U/L (ref 13–56)
AMYLASE SERPL-CCNC: 85 U/L (ref 25–115)
ANION GAP BLD CALC-SCNC: 9 MMOL/L (ref 3–18)
APPEARANCE UR: ABNORMAL
AST SERPL W P-5'-P-CCNC: 54 U/L (ref 15–37)
BACTERIA URNS QL MICRO: ABNORMAL /HPF
BASOPHILS # BLD AUTO: 0 K/UL (ref 0–0.1)
BASOPHILS # BLD: 0 % (ref 0–2)
BILIRUB SERPL-MCNC: 0.7 MG/DL (ref 0.2–1)
BILIRUB UR QL: NEGATIVE
BUN SERPL-MCNC: 14 MG/DL (ref 7–18)
BUN/CREAT SERPL: 12 (ref 12–20)
CALCIUM SERPL-MCNC: 9.7 MG/DL (ref 8.5–10.1)
CHLORIDE SERPL-SCNC: 100 MMOL/L (ref 100–108)
CO2 SERPL-SCNC: 26 MMOL/L (ref 21–32)
COLOR UR: YELLOW
CREAT SERPL-MCNC: 1.16 MG/DL (ref 0.6–1.3)
DIFFERENTIAL METHOD BLD: ABNORMAL
EOSINOPHIL # BLD: 0.1 K/UL (ref 0–0.4)
EOSINOPHIL NFR BLD: 2 % (ref 0–5)
EPITH CASTS URNS QL MICRO: ABNORMAL /LPF (ref 0–5)
ERYTHROCYTE [DISTWIDTH] IN BLOOD BY AUTOMATED COUNT: 14.7 % (ref 11.6–14.5)
GLOBULIN SER CALC-MCNC: 3.5 G/DL (ref 2–4)
GLUCOSE SERPL-MCNC: 123 MG/DL (ref 74–99)
GLUCOSE UR STRIP.AUTO-MCNC: NEGATIVE MG/DL
HCT VFR BLD AUTO: 33.9 % (ref 35–45)
HGB BLD-MCNC: 11.9 G/DL (ref 12–16)
HGB UR QL STRIP: NEGATIVE
KETONES UR QL STRIP.AUTO: NEGATIVE MG/DL
LEUKOCYTE ESTERASE UR QL STRIP.AUTO: ABNORMAL
LIPASE SERPL-CCNC: 134 U/L (ref 73–393)
LYMPHOCYTES # BLD AUTO: 60 % (ref 21–52)
LYMPHOCYTES # BLD: 2.8 K/UL (ref 0.9–3.6)
MCH RBC QN AUTO: 28.1 PG (ref 24–34)
MCHC RBC AUTO-ENTMCNC: 35.1 G/DL (ref 31–37)
MCV RBC AUTO: 80 FL (ref 74–97)
MONOCYTES # BLD: 0.1 K/UL (ref 0.05–1.2)
MONOCYTES NFR BLD AUTO: 3 % (ref 3–10)
NEUTS SEG # BLD: 1.6 K/UL (ref 1.8–8)
NEUTS SEG NFR BLD AUTO: 35 % (ref 40–73)
NITRITE UR QL STRIP.AUTO: NEGATIVE
PH UR STRIP: 7.5 [PH] (ref 5–8)
PLATELET # BLD AUTO: 137 K/UL (ref 135–420)
PLATELET COMMENTS,PCOM: ABNORMAL
PMV BLD AUTO: 13.2 FL (ref 9.2–11.8)
POTASSIUM SERPL-SCNC: 3 MMOL/L (ref 3.5–5.5)
PROT SERPL-MCNC: 7.9 G/DL (ref 6.4–8.2)
PROT UR STRIP-MCNC: NEGATIVE MG/DL
RBC # BLD AUTO: 4.24 M/UL (ref 4.2–5.3)
RBC #/AREA URNS HPF: ABNORMAL /HPF (ref 0–5)
RBC MORPH BLD: ABNORMAL
SODIUM SERPL-SCNC: 135 MMOL/L (ref 136–145)
SP GR UR REFRACTOMETRY: 1.02 (ref 1–1.03)
UROBILINOGEN UR QL STRIP.AUTO: 1 EU/DL (ref 0.2–1)
WBC # BLD AUTO: 4.6 K/UL (ref 4.6–13.2)
WBC URNS QL MICRO: ABNORMAL /HPF (ref 0–4)

## 2017-06-19 PROCEDURE — 82150 ASSAY OF AMYLASE: CPT | Performed by: EMERGENCY MEDICINE

## 2017-06-19 PROCEDURE — 74177 CT ABD & PELVIS W/CONTRAST: CPT

## 2017-06-19 PROCEDURE — 83690 ASSAY OF LIPASE: CPT | Performed by: EMERGENCY MEDICINE

## 2017-06-19 PROCEDURE — 80053 COMPREHEN METABOLIC PANEL: CPT | Performed by: EMERGENCY MEDICINE

## 2017-06-19 PROCEDURE — 74011250637 HC RX REV CODE- 250/637: Performed by: PHYSICIAN ASSISTANT

## 2017-06-19 PROCEDURE — 74011636320 HC RX REV CODE- 636/320: Performed by: EMERGENCY MEDICINE

## 2017-06-19 PROCEDURE — 85025 COMPLETE CBC W/AUTO DIFF WBC: CPT | Performed by: EMERGENCY MEDICINE

## 2017-06-19 PROCEDURE — 81001 URINALYSIS AUTO W/SCOPE: CPT | Performed by: EMERGENCY MEDICINE

## 2017-06-19 PROCEDURE — 74011250636 HC RX REV CODE- 250/636: Performed by: PHYSICIAN ASSISTANT

## 2017-06-19 RX ORDER — CIPROFLOXACIN 500 MG/1
500 TABLET ORAL 2 TIMES DAILY
Qty: 14 TAB | Refills: 0 | Status: SHIPPED | OUTPATIENT
Start: 2017-06-19 | End: 2017-06-26

## 2017-06-19 RX ORDER — CIPROFLOXACIN 500 MG/1
500 TABLET ORAL
Status: COMPLETED | OUTPATIENT
Start: 2017-06-19 | End: 2017-06-19

## 2017-06-19 RX ORDER — OXYCODONE AND ACETAMINOPHEN 5; 325 MG/1; MG/1
1 TABLET ORAL
Qty: 16 TAB | Refills: 0 | Status: SHIPPED | OUTPATIENT
Start: 2017-06-19 | End: 2021-08-26

## 2017-06-19 RX ORDER — FERROUS SULFATE 325(65) MG
325 TABLET, DELAYED RELEASE (ENTERIC COATED) ORAL 2 TIMES DAILY
Qty: 28 TAB | Refills: 0 | Status: SHIPPED | OUTPATIENT
Start: 2017-06-19 | End: 2021-08-26

## 2017-06-19 RX ORDER — HYDROMORPHONE HYDROCHLORIDE 1 MG/ML
0.5 INJECTION, SOLUTION INTRAMUSCULAR; INTRAVENOUS; SUBCUTANEOUS
Status: COMPLETED | OUTPATIENT
Start: 2017-06-19 | End: 2017-06-19

## 2017-06-19 RX ORDER — POTASSIUM CHLORIDE 1500 MG/1
20 TABLET, FILM COATED, EXTENDED RELEASE ORAL 2 TIMES DAILY
Qty: 6 TAB | Refills: 0 | Status: SHIPPED | OUTPATIENT
Start: 2017-06-19 | End: 2019-05-22

## 2017-06-19 RX ORDER — POTASSIUM CHLORIDE 20 MEQ/1
40 TABLET, EXTENDED RELEASE ORAL
Status: COMPLETED | OUTPATIENT
Start: 2017-06-19 | End: 2017-06-19

## 2017-06-19 RX ORDER — METRONIDAZOLE 500 MG/1
500 TABLET ORAL
Status: COMPLETED | OUTPATIENT
Start: 2017-06-19 | End: 2017-06-19

## 2017-06-19 RX ORDER — SODIUM CHLORIDE 9 MG/ML
1000 INJECTION, SOLUTION INTRAVENOUS ONCE
Status: COMPLETED | OUTPATIENT
Start: 2017-06-19 | End: 2017-06-19

## 2017-06-19 RX ORDER — METRONIDAZOLE 500 MG/1
500 TABLET ORAL 3 TIMES DAILY
Qty: 21 TAB | Refills: 0 | Status: SHIPPED | OUTPATIENT
Start: 2017-06-19 | End: 2017-06-26

## 2017-06-19 RX ORDER — OXYCODONE AND ACETAMINOPHEN 5; 325 MG/1; MG/1
1 TABLET ORAL
Status: COMPLETED | OUTPATIENT
Start: 2017-06-19 | End: 2017-06-19

## 2017-06-19 RX ADMIN — SODIUM CHLORIDE 1000 ML: 900 INJECTION, SOLUTION INTRAVENOUS at 00:49

## 2017-06-19 RX ADMIN — METRONIDAZOLE 500 MG: 500 TABLET ORAL at 02:59

## 2017-06-19 RX ADMIN — CIPROFLOXACIN HYDROCHLORIDE 500 MG: 500 TABLET, FILM COATED ORAL at 02:58

## 2017-06-19 RX ADMIN — OXYCODONE HYDROCHLORIDE AND ACETAMINOPHEN 1 TABLET: 5; 325 TABLET ORAL at 03:51

## 2017-06-19 RX ADMIN — HYDROMORPHONE HYDROCHLORIDE 0.5 MG: 1 INJECTION, SOLUTION INTRAMUSCULAR; INTRAVENOUS; SUBCUTANEOUS at 00:45

## 2017-06-19 RX ADMIN — POTASSIUM CHLORIDE 40 MEQ: 20 TABLET, EXTENDED RELEASE ORAL at 02:55

## 2017-06-19 RX ADMIN — IOPAMIDOL 100 ML: 612 INJECTION, SOLUTION INTRAVENOUS at 01:07

## 2017-06-19 NOTE — ED PROVIDER NOTES
HPI Comments: 47 yr old female, hx DM, GERD, htn, hyperlipidemia, and thyroid disease, presents to the ED complaining of lower abdominal pain worsening over the past hour. Pt states she has had this pain intermittently for several months and has seen a GI doctor. Pt states she has a colonoscopy a few months ago that was normal. Denies N/V and diarrhea. Denies urinary sx. No other complaints. Patient is a 47 y.o. female presenting with abdominal pain. Abdominal Pain    Pertinent negatives include no fever, no diarrhea, no nausea, no vomiting, no constipation, no dysuria, no frequency, no hematuria, no headaches, no chest pain and no back pain.         Past Medical History:   Diagnosis Date    Breast cancer ([facility] [address]     right breast- chemo and radiation    Diabetes ([facility] [address] 2016    GERD (gastroesophageal reflux disease)     H/O bilateral mastectomy     H/O seasonal allergies     H/O: pituitary tumor 2016    History of chemotherapy     Hyperlipidemia 2016    initial     Hypertension 2016    Nausea & vomiting     S/P radiation therapy [id number]    Subclinical hypothyroidism 2016    Thyroid nodule 2016    multiple- no suspicous concerns       Past Surgical History:   Procedure Laterality Date    COLONOSCOPY N/A 2/15/2017    COLONOSCOPY performed by Sherrie Astorga MD at [address] HX BREAST BIOPSY Right 2017    RIGHT BREAST EXCIONAL BIOPSY performed by Buddy Bhakta MD at [address] HX GYN       x2    HX MASTECTOMY Bilateral     HX TUMOR REMOVAL  2016    pituitary tumor    IMPLANT BREAST SILICONE/EQ Bilateral 5015         Family History:   Problem Relation Age of Onset    Hypertension Mother     Diabetes Mother     Hypertension Father     Diabetes Father     Cancer Neg Hx     Stroke Neg Hx     Heart Disease Neg Hx        Social History     Social History    Marital status: SINGLE     Spouse name: N/A    Number of children: N/A    Years of education: N/A     Occupational History    Not on file. Social History Main Topics    Smoking status: Never Smoker    Smokeless tobacco: Never Used    Alcohol use No    Drug use: No    Sexual activity: Not Currently     Other Topics Concern    Not on file     Social History Narrative         ALLERGIES: Adhesive tape-silicones and Morphine    Review of Systems   Constitutional: Negative. Negative for chills, diaphoresis, fatigue and fever. HENT: Negative. Negative for congestion, ear pain, rhinorrhea and sore throat. Eyes: Negative. Negative for pain, redness and visual disturbance. Respiratory: Negative. Negative for cough, shortness of breath, wheezing and stridor. Cardiovascular: Negative. Negative for chest pain and leg swelling. Gastrointestinal: Positive for abdominal pain. Negative for constipation, diarrhea, nausea and vomiting. Endocrine: Negative. Genitourinary: Negative. Negative for dysuria, flank pain, frequency and hematuria. Musculoskeletal: Negative. Negative for back pain. Skin: Negative. Negative for rash and wound. Allergic/Immunologic: Negative. Neurological: Negative. Negative for dizziness, seizures, syncope, weakness, light-headedness, numbness and headaches. Hematological: Negative. Psychiatric/Behavioral: Negative. All other systems reviewed and are negative. Vitals:    06/18/17 2345 06/19/17 0052   BP: 119/83 97/65   Pulse: 78 73   Resp: 16 10   Temp: 97.3 °F (36.3 °C)    SpO2: 99% (!) 80%   Weight: 74.8 kg (165 lb)             Physical Exam   Constitutional: She is oriented to person, place, and time. She appears well-developed and well-nourished. She appears distressed. HENT:   Head: Normocephalic. Neck: Normal range of motion. Neck supple. Cardiovascular: Normal rate, regular rhythm and normal heart sounds. Exam reveals no gallop and no friction rub. No murmur heard.   Pulmonary/Chest: Effort normal and breath sounds normal. No stridor. No respiratory distress. She has no wheezes. She has no rales. Abdominal: Soft. Bowel sounds are normal. She exhibits no distension and no mass. There is tenderness. There is guarding. There is no rebound. Suprapubic and bilateral lower abdominal TTP with guarding noted   Musculoskeletal: Normal range of motion. Neurological: She is alert and oriented to person, place, and time. Coordination normal.   Skin: Skin is warm and dry. No rash noted. She is not diaphoretic. No erythema. Psychiatric: She has a normal mood and affect. Her behavior is normal. Thought content normal.   Nursing note and vitals reviewed. MDM  Number of Diagnoses or Management Options  Diagnosis management comments: Impression:  Colitis, lower abdominal pain, UTI, hypokalemia, anemia    IV inserted 1 mg dilaudid and 1 L saline given    hgb 11.9, hct 33.9, RDW 14.7, MPLV 13.2, grans 35, lymph 60, ABG 1.6, Na 135, K 3, glucose 123, GFRNA 49, SGOT 54, lipase/amylase unremarkable     CT abd: 1. Perhaps mild mural thickening of the left colon to the sigmoid colon, which  may represent a nonspecific colitis (infectious, inflammatory, or ischemic). Nondistended fluid-filled loops of small bowel may represent a nonspecific  enteritis.     2. Large stool burden, with a mixture of both solid and fluid contents  throughout the colon.     3. The appendix is not confidently identified, but no significant pericecal  stranding.     4. Coarsely calcified left adnexal mass (previously there was a 5 cm left  adnexal mass in 2011). This can be further evaluated with nonemergent pelvic  ultrasound.     5. Hepatic steatosis    Discussed these results with the pt. Will cover pt with cipro and flagyl. First dose given in the ED. Potassium given PO. Pt sx improved. Patient is stable for discharge at this time. Rx for potassium chloride, ferrous sulfate, percocet, cipro, and flagyl given. Rest and follow-up with GI this week. Return to the ED immediately for any new or worsening sx.   Jayshree Cleveland PA-C 2:57 AM        Amount and/or Complexity of Data Reviewed  Clinical lab tests: ordered and reviewed  Tests in the radiology section of CPT®: ordered and reviewed    Risk of Complications, Morbidity, and/or Mortality  Presenting problems: moderate  Diagnostic procedures: moderate  Management options: moderate    Patient Progress  Patient progress: improved    ED Course       Procedures

## 2017-06-19 NOTE — DISCHARGE INSTRUCTIONS
Colitis: Care Instructions  Your Care Instructions  Colitis is the medical term for swelling (inflammation) of the intestine. It can be caused by different things, such as an infection or loss of blood flow in the intestine. Other causes are problems like Crohn's disease or ulcerative colitis. Symptoms may include fever, diarrhea that may be bloody, or belly pain. Sometimes symptoms go away without treatment. But you may need treatment or more tests, such as blood tests or a stool test. Or you may need imaging tests like a CT scan or a colonoscopy. In some cases, the doctor may want to test a sample of tissue from the intestine. This test is called a biopsy. The doctor has checked you carefully, but problems can develop later. If you notice any problems or new symptoms, get medical treatment right away. Follow-up care is a key part of your treatment and safety. Be sure to make and go to all appointments, and call your doctor if you are having problems. It's also a good idea to know your test results and keep a list of the medicines you take. How can you care for yourself at home? · Rest until you feel better. · Your doctor may recommend that you eat bland foods. These include rice, dry toast or crackers, bananas, and applesauce. · To prevent dehydration, drink plenty of fluids. Choose water and other caffeine-free clear liquids until you feel better. If you have kidney, heart, or liver disease and have to limit fluids, talk with your doctor before you increase the amount of fluids you drink. · Be safe with medicines. Take your medicines exactly as prescribed. Call your doctor if you think you are having a problem with your medicine. You will get more details on the specific medicines your doctor prescribes. When should you call for help? Call 911 anytime you think you may need emergency care. For example, call if:  · You passed out (lost consciousness).   · You vomit blood or what looks like coffee grounds. · Your stools are maroon or very bloody. Call your doctor now or seek immediate medical care if:  · You have new or worse pain. · You have a new or higher fever. · You have new or worse symptoms. · You cannot keep fluids or medicines down. Watch closely for changes in your health, and be sure to contact your doctor if:  · You do not get better as expected. Where can you learn more? Go to http://kvng-koki.info/. Gerard Pal in the search box to learn more about \"Colitis: Care Instructions. \"  Current as of: 2016  Content Version: 11.2  © 8111-8220 [employer]. Care instructions adapted under license by [employer] (which disclaims liability or warranty for this information). If you have questions about a medical condition or this instruction, always ask your healthcare professional. [place] [address] any warranty or liability for your use of this information. inZair Activation    Thank you for requesting access to inZair. Please follow the instructions below to securely access and download your online medical record. inZair allows you to send messages to your doctor, view your test results, renew your prescriptions, schedule appointments, and more. How Do I Sign Up? 1. In your internet browser, go to [email or web address]  2. Click on the First Time User? Click Here link in the Sign In box. You will be redirect to the New Member Sign Up page. 3. Enter your inZair Access Code exactly as it appears below. You will not need to use this code after youve completed the sign-up process. If you do not sign up before the expiration date, you must request a new code. inZair Access Code: Activation code not generated  Current inZair Status: Patient Declined (This is the date your inZair access code will )    4.  Enter the last four digits of your Social Security Number (xxxx) and Date of Birth (mm/dd/yyyy) as indicated and click Submit. You will be taken to the next sign-up page. 5. Create a 9DIAMOND ID. This will be your 9DIAMOND login ID and cannot be changed, so think of one that is secure and easy to remember. 6. Create a 9DIAMOND password. You can change your password at any time. 7. Enter your Password Reset Question and Answer. This can be used at a later time if you forget your password. 8. Enter your e-mail address. You will receive e-mail notification when new information is available in [address] 9. Click Sign Up. You can now view and download portions of your medical record. 10. Click the Download Summary menu link to download a portable copy of your medical information. Additional Information    If you have questions, please visit the Frequently Asked Questions section of the 9DIAMOND website at [email or web address] Remember, 9DIAMOND is NOT to be used for urgent needs. For medical emergencies, dial 911.

## 2017-06-19 NOTE — ED NOTES
I have reviewed discharge instructions with the patient. The patient verbalized understanding. Discharge medications reviewed with patient and appropriate educational materials and side effects teaching were provided. Pt is AxOx4, NAD. Pt states that her stomach feels better. Pt taken out of ED by wheelchair.

## 2017-07-28 NOTE — PROGRESS NOTES
[facility]  General Surgery    Subjective:  Patient doing well following right breast excisional biopsy. Objective:  Vitals:    05/12/17 1406   BP: 100/80   Pulse: 85   Resp: 16   Temp: 98.2 °F (36.8 °C)   TempSrc: Oral   Weight: 75.3 kg (166 lb)   Height: 5' 4\" (1.626 m)       Physical Exam:    General: Awake and alert, oriented ×4, no apparent distress   Breasts:    Right: No dimpling, discoloration, nipple inversion or retractions. No axillary or supraclavicular lymphadenopathy. No mass   Incision clean dry and intact        Current Medications:  Current Outpatient Prescriptions   Medication Sig Dispense Refill    ibuprofen (MOTRIN) 800 mg tablet Take 1 Tab by mouth three (3) times daily as needed for Pain. 40 Tab 0    docusate sodium (COLACE) 50 mg capsule Take 1 Cap by mouth two (2) times a day for 90 days. 60 Cap 2    senna-docusate (DOC-Q-LAX) 8.6-50 mg per tablet Take 1 Tab by mouth daily. 30 Tab 11    hydroCHLOROthiazide (HYDRODIURIL) 25 mg tablet Take 1 Tab by mouth daily. 90 Tab 1    metFORMIN ER (GLUCOPHAGE XR) 500 mg tablet Take 1 Tab by mouth two (2) times daily (with meals). 180 Tab 1    atorvastatin (LIPITOR) 40 mg tablet Take 1 Tab by mouth daily. 90 Tab 1    amLODIPine (NORVASC) 5 mg tablet Take 1 Tab by mouth daily. 90 Tab 1    raNITIdine (ZANTAC) 150 mg tablet Take 1 Tab by mouth two (2) times a day. 180 Tab 3    albuterol (PROVENTIL HFA, VENTOLIN HFA, PROAIR HFA) 90 mcg/actuation inhaler Take 1 Puff by inhalation every four (4) hours as needed for Wheezing or Shortness of Breath. 1 Inhaler 0    glucose blood VI test strips (ASCENSIA AUTODISC VI, ONE TOUCH ULTRA TEST VI) strip Check glucose daily in the AM 50 Strip 0    Lancets misc Check glucose daily. One touch Ultra lancets. 50 Each 0    potassium chloride SR (K-TAB) 20 mEq tablet Take 1 Tab by mouth two (2) times a day.  6 Tab 0    oxyCODONE-acetaminophen (PERCOCET) 5-325 mg per tablet Take 1 Tab by mouth every four (4) hours as needed for Pain. Max Daily Amount: 6 Tabs. 16 Tab 0    ferrous sulfate (IRON) 325 mg (65 mg iron) EC tablet Take 1 Tab by mouth two (2) times a day. 28 Tab 0    cyclobenzaprine (FLEXERIL) 5 mg tablet Take 1 Tab by mouth every twelve (12) hours as needed for Muscle Spasm(s). 90 Tab 1    glimepiride (AMARYL) 2 mg tablet Take 1 Tab by mouth every morning. 90 Tab 1       Chart and notes reviewed. Data reviewed. I have evaluated and examined the patient.         Impression and plan:  Patient doing well following excision of benign necrosis and calcifications from the right breast.  Follow-up in 6 months for mammogram and clinical breast exam     Lani Ortega MD

## 2017-11-09 ENCOUNTER — TELEPHONE (OUTPATIENT)
Dept: SURGERY | Age: 52
End: 2017-11-09

## 2017-11-09 DIAGNOSIS — Z85.3 PERSONAL HISTORY OF BREAST CANCER: Primary | ICD-10-CM

## 2017-11-09 NOTE — TELEPHONE ENCOUNTER
I called [phone] at 1:32pm on 11/09/2017 and spoke with Mrs. Dimple Middleton to inform her that we have to reschedule her appointment with Dr. Binta Melchor on November 14, 2017. I explained to Mrs. Dimple Middleton that she needs to have her Mammogram completed prior to her seeing Dr. Binta Melchor. Mrs. Dimple Middleton started to have a nasty tone to her voice, saying that, \" she was never told that she was suppose to have a mammogram in 6 months, by Dr. Binta Melchor. \" Mrs. Dimple Middleton stated that, \" that's why she got the mammogram before she saw Dr. Binta Melchor the first time. \" I explained to her that Dr. Binta Melchor stated in his notes, \" that he wanted her to complete a mammogram in 6 months before returning to see him. \"

## 2017-11-14 ENCOUNTER — OFFICE VISIT (OUTPATIENT)
Dept: SURGERY | Age: 52
End: 2017-11-14

## 2017-11-14 VITALS
SYSTOLIC BLOOD PRESSURE: 102 MMHG | RESPIRATION RATE: 18 BRPM | DIASTOLIC BLOOD PRESSURE: 66 MMHG | HEART RATE: 72 BPM | TEMPERATURE: 97.6 F | WEIGHT: 153 LBS | BODY MASS INDEX: 26.26 KG/M2

## 2017-11-14 DIAGNOSIS — Z85.3 HISTORY OF RIGHT BREAST CANCER: ICD-10-CM

## 2017-11-14 DIAGNOSIS — R92.0 MICROCALCIFICATIONS OF THE BREAST: Primary | ICD-10-CM

## 2017-11-14 RX ORDER — POLYETHYLENE GLYCOL 3350 17 G/17G
17 POWDER, FOR SOLUTION ORAL DAILY
COMMUNITY
End: 2021-08-26

## 2017-11-14 NOTE — PROGRESS NOTES
[facility]  General Surgery    Subjective:  Patient returns today stating that she still has some soreness at the biopsy site. The biopsy revealed benign calcifications. She did not have her mammogram which is scheduled because of the soreness. She denies any unintentional weight loss. She has lost some weight at least 8 pounds since her last visit because she has cut out rice and breads and sweets from her diet. She states that she eats mostly fruits. She hardly eats any meat. Objective:  Vitals:    11/14/17 1035   BP: 102/66   Pulse: 72   Resp: 18   Temp: 97.6 °F (36.4 °C)   TempSrc: Oral   Weight: 69.4 kg (153 lb)       Physical Exam:    General: Awake and alert, oriented ×4, no apparent distress   Breasts:    Left: No dimpling, discoloration   No axillary or supraclavicular lymphadenopathy. No mass   Right: No dimpling, discoloration   No axillary or supraclavicular lymphadenopathy. No mass          Current Medications:  Current Outpatient Prescriptions   Medication Sig Dispense Refill    polyethylene glycol (MIRALAX) 17 gram packet Take 17 g by mouth daily.  oxyCODONE-acetaminophen (PERCOCET) 5-325 mg per tablet Take 1 Tab by mouth every four (4) hours as needed for Pain. Max Daily Amount: 6 Tabs. 16 Tab 0    ferrous sulfate (IRON) 325 mg (65 mg iron) EC tablet Take 1 Tab by mouth two (2) times a day. 28 Tab 0    cyclobenzaprine (FLEXERIL) 5 mg tablet Take 1 Tab by mouth every twelve (12) hours as needed for Muscle Spasm(s). 90 Tab 1    ibuprofen (MOTRIN) 800 mg tablet Take 1 Tab by mouth three (3) times daily as needed for Pain. 40 Tab 0    senna-docusate (DOC-Q-LAX) 8.6-50 mg per tablet Take 1 Tab by mouth daily. 30 Tab 11    hydroCHLOROthiazide (HYDRODIURIL) 25 mg tablet Take 1 Tab by mouth daily. 90 Tab 1    metFORMIN ER (GLUCOPHAGE XR) 500 mg tablet Take 1 Tab by mouth two (2) times daily (with meals).  180 Tab 1    atorvastatin (LIPITOR) 40 mg tablet Take 1 Tab by mouth daily. 90 Tab 1    amLODIPine (NORVASC) 5 mg tablet Take 1 Tab by mouth daily. 90 Tab 1    glimepiride (AMARYL) 2 mg tablet Take 1 Tab by mouth every morning. 90 Tab 1    raNITIdine (ZANTAC) 150 mg tablet Take 1 Tab by mouth two (2) times a day. 180 Tab 3    albuterol (PROVENTIL HFA, VENTOLIN HFA, PROAIR HFA) 90 mcg/actuation inhaler Take 1 Puff by inhalation every four (4) hours as needed for Wheezing or Shortness of Breath. 1 Inhaler 0    potassium chloride SR (K-TAB) 20 mEq tablet Take 1 Tab by mouth two (2) times a day. 6 Tab 0    glucose blood VI test strips (ASCENSIA AUTODISC VI, ONE TOUCH ULTRA TEST VI) strip Check glucose daily in the AM 50 Strip 0    Lancets misc Check glucose daily. One touch Ultra lancets. 50 Each 0       Chart and notes reviewed. Data reviewed. I have evaluated and examined the patient. Impression and plan:  Patient with history of right breast cancer status post bilateral mastectomy in 2010 who is status post excisional biopsy of a benign mass from the right breast 6 months ago. Right breast diagnostic mammogram  We will call the patient with the mammography results.   Continue monthly self breast exam  Follow-up in 6 months for clinical exam and mammogram.     Shoaib Campuzano MD

## 2017-11-14 NOTE — MR AVS SNAPSHOT
Visit Information Date & Time Provider Department Dept. Phone Encounter #  
 11/14/2017 10:00 AM Dana Crooks  [address] [id number] Upcoming Health Maintenance Date Due  
 FOOT EXAM Q1 5/28/1976 EYE EXAM RETINAL OR DILATED Q1 5/28/1976 DTaP/Tdap/Td series (1 - Tdap) 5/28/1987 MICROALBUMIN Q1 5/20/2017 LIPID PANEL Q1 5/20/2017 Influenza Age 5 to Adult 8/1/2017 HEMOGLOBIN A1C Q6M 9/21/2017 PAP AKA CERVICAL CYTOLOGY 6/17/2021 COLONOSCOPY 2/15/2027 Allergies as of 11/14/2017  Review Complete On: 11/14/2017 By: Dana Crooks MD  
  
 Severity Noted Reaction Type Reactions Adhesive Tape-silicones  51/85/5162    Rash Morphine  09/22/2013    Itching Current Immunizations  Never Reviewed No immunizations on file. Not reviewed this visit Vitals BP Pulse Temp Resp Weight(growth percentile) BMI  
 102/66 72 97.6 °F (36.4 °C) (Oral) 18 153 lb (69.4 kg) 26.26 kg/m2 OB Status Smoking Status Medically Induced Never Smoker Vitals History BMI and BSA Data Body Mass Index Body Surface Area  
 26.26 kg/m 2 1.77 m 2 Preferred Pharmacy Pharmacy Name Phone [id number] [phone] Your Updated Medication List  
  
   
This list is accurate as of: 11/14/17 11:26 AM.  Always use your most recent med list.  
  
  
  
  
 albuterol 90 mcg/actuation inhaler Commonly known as:  PROVENTIL HFA, VENTOLIN HFA, PROAIR HFA Take 1 Puff by inhalation every four (4) hours as needed for Wheezing or Shortness of Breath. amLODIPine 5 mg tablet Commonly known as:  [place] Take 1 Tab by mouth daily. atorvastatin 40 mg tablet Commonly known as:  LIPITOR Take 1 Tab by mouth daily. cyclobenzaprine 5 mg tablet Commonly known as:  FLEXERIL  
 Take 1 Tab by mouth every twelve (12) hours as needed for Muscle Spasm(s). ferrous sulfate 325 mg (65 mg iron) EC tablet Commonly known as:  IRON Take 1 Tab by mouth two (2) times a day. glimepiride 2 mg tablet Commonly known as:  AMARYL Take 1 Tab by mouth every morning. glucose blood VI test strips strip Commonly known as:  ASCENSIA AUTODISC VI, ONE TOUCH ULTRA TEST VI Check glucose daily in the AM  
  
 hydroCHLOROthiazide 25 mg tablet Commonly known as:  HYDRODIURIL Take 1 Tab by mouth daily. ibuprofen 800 mg tablet Commonly known as:  MOTRIN Take 1 Tab by mouth three (3) times daily as needed for Pain. Lancets Misc Check glucose daily. One touch Ultra lancets. metFORMIN  mg tablet Commonly known as:  GLUCOPHAGE XR Take 1 Tab by mouth two (2) times daily (with meals). MIRALAX 17 gram packet Generic drug:  polyethylene glycol Take 17 g by mouth daily. oxyCODONE-acetaminophen 5-325 mg per tablet Commonly known as:  PERCOCET Take 1 Tab by mouth every four (4) hours as needed for Pain. Max Daily Amount: 6 Tabs. potassium chloride SR 20 mEq tablet Commonly known as:  K-TAB Take 1 Tab by mouth two (2) times a day. raNITIdine 150 mg tablet Commonly known as:  ZANTAC Take 1 Tab by mouth two (2) times a day. senna-docusate 8.6-50 mg per tablet Commonly known as:  DOC-Q-LAX Take 1 Tab by mouth daily. Introducing [facility]! Dear Kathi Chris: 
Thank you for requesting a FlameStower account. Our records indicate that you have previously registered for a FlameStower account but its currently inactive. Please call our FlameStower support line at [phone]. Additional Information If you have questions, please visit the Frequently Asked Questions section of the FlameStower website at [email or web address] Remember, FlameStower is NOT to be used for urgent needs.  For medical emergencies, dial 911. Now available from your iPhone and Android! Please provide this summary of care documentation to your next provider. Your primary care clinician is listed as Jazzy Koehler. If you have any questions after today's visit, please call [phone].

## 2017-11-18 DIAGNOSIS — E11.9 TYPE 2 DIABETES MELLITUS WITHOUT COMPLICATION, WITHOUT LONG-TERM CURRENT USE OF INSULIN (HCC): ICD-10-CM

## 2017-11-21 NOTE — TELEPHONE ENCOUNTER
11/21/2017  10:15 AM    Chief Complaint   Patient presents with    Medication Refill       Noted refill request for Metformin. Last office visit 3/2017 and no upcoming appointments noted. Was supposed to have been seen in 6/2017. Forwarded to clinical staff to get patient scheduled and then will send in supply of Metformin until she can be seen.

## 2017-11-28 NOTE — TELEPHONE ENCOUNTER
Call made to pt and 2 identifiers used. Pt made aware an appointment is needed to get refill. Pt made aware of [facility]'s transition to [place] and pt wants to continue to be seen at the [facility]. Pt will call back to make an appointment.

## 2017-11-29 RX ORDER — METFORMIN HYDROCHLORIDE 500 MG/1
TABLET, EXTENDED RELEASE ORAL
Qty: 180 TAB | Refills: 1 | OUTPATIENT
Start: 2017-11-29

## 2018-01-17 DIAGNOSIS — K21.9 GASTROESOPHAGEAL REFLUX DISEASE WITHOUT ESOPHAGITIS: ICD-10-CM

## 2018-01-18 RX ORDER — RANITIDINE 150 MG/1
TABLET, FILM COATED ORAL
Qty: 180 TAB | Refills: 2 | OUTPATIENT
Start: 2018-01-18

## 2018-01-18 NOTE — TELEPHONE ENCOUNTER
1/18/2018  5:03 PM    Chief Complaint   Patient presents with    Medication Refill       Noted refill request for below medications. She was notified on 11/28 that an appointment was needed as she had not been seen in office since 4/2017. No upcoming appointments noted. Refill declined.        Requested Prescriptions     Pending Prescriptions Disp Refills    raNITIdine (ZANTAC) 150 mg tablet [Pharmacy Med Name: RANITIDINE 150 MG TABLET] 180 Tab 2     Sig: TAKE 1 TABLET BY MOUTH TWICE A DAY

## 2018-05-14 ENCOUNTER — TELEPHONE (OUTPATIENT)
Dept: SURGERY | Age: 53
End: 2018-05-14

## 2018-05-14 NOTE — TELEPHONE ENCOUNTER
Patient chose to reschedule appt due to not having a ride, informed patient she needs to schedule an appt to have her mammogram done and then make a follow up appt to see Dr Iveth Law, patient verbally understood.

## 2018-06-04 ENCOUNTER — DOCUMENTATION ONLY (OUTPATIENT)
Dept: FAMILY MEDICINE CLINIC | Age: 53
End: 2018-06-04

## 2018-06-04 NOTE — PROGRESS NOTES
6/4/2018  2:08 PM    Chief Complaint   Patient presents with    Other         Patient has not been seen in office in over a year. Last visit 3/2017 at [facility] I am no longer seeing patients at this location. Letter has been sent to regarding making an appointment with no response. Removed self as PCP. Patient may still be seen in future in office and reassigned if patient desires.

## 2018-06-08 ENCOUNTER — HOSPITAL ENCOUNTER (OUTPATIENT)
Dept: GENERAL RADIOLOGY | Age: 53
Discharge: HOME | End: 2018-06-08
Payer: COMMERCIAL

## 2018-06-08 DIAGNOSIS — M17.12 ARTHRITIS OF KNEE, LEFT: ICD-10-CM

## 2018-06-08 PROCEDURE — 73564 X-RAY EXAM KNEE 4 OR MORE: CPT

## 2019-03-10 ENCOUNTER — HOSPITAL ENCOUNTER (EMERGENCY)
Age: 54
Discharge: HOME | End: 2019-03-10
Attending: EMERGENCY MEDICINE
Payer: MEDICARE

## 2019-03-10 VITALS
OXYGEN SATURATION: 100 % | SYSTOLIC BLOOD PRESSURE: 100 MMHG | TEMPERATURE: 98.8 F | DIASTOLIC BLOOD PRESSURE: 67 MMHG | HEART RATE: 58 BPM | RESPIRATION RATE: 11 BRPM

## 2019-03-10 DIAGNOSIS — R79.89 ELEVATED LFTS: ICD-10-CM

## 2019-03-10 DIAGNOSIS — D69.6 THROMBOCYTOPENIA (HCC): ICD-10-CM

## 2019-03-10 DIAGNOSIS — R42 DIZZINESS: Primary | ICD-10-CM

## 2019-03-10 DIAGNOSIS — B34.9 VIRAL SYNDROME: ICD-10-CM

## 2019-03-10 LAB
ALBUMIN SERPL-MCNC: 3.9 G/DL (ref 3.4–5)
ALBUMIN/GLOB SERPL: 1 {RATIO} (ref 0.8–1.7)
ALP SERPL-CCNC: 100 U/L (ref 45–117)
ALT SERPL-CCNC: 63 U/L (ref 13–56)
ANION GAP SERPL CALC-SCNC: 7 MMOL/L (ref 3–18)
AST SERPL-CCNC: 90 U/L (ref 15–37)
ATRIAL RATE: 69 BPM
BASOPHILS # BLD: 0 K/UL (ref 0–0.1)
BASOPHILS NFR BLD: 0 % (ref 0–2)
BILIRUB SERPL-MCNC: 0.6 MG/DL (ref 0.2–1)
BUN SERPL-MCNC: 14 MG/DL (ref 7–18)
BUN/CREAT SERPL: 12 (ref 12–20)
CALCIUM SERPL-MCNC: 8.1 MG/DL (ref 8.5–10.1)
CALCULATED P AXIS, ECG09: 53 DEGREES
CALCULATED R AXIS, ECG10: 51 DEGREES
CALCULATED T AXIS, ECG11: -110 DEGREES
CHLORIDE SERPL-SCNC: 108 MMOL/L (ref 100–108)
CK MB CFR SERPL CALC: 0.2 % (ref 0–4)
CK MB SERPL-MCNC: 1.2 NG/ML (ref 5–25)
CK SERPL-CCNC: 540 U/L (ref 26–192)
CO2 SERPL-SCNC: 24 MMOL/L (ref 21–32)
CREAT SERPL-MCNC: 1.13 MG/DL (ref 0.6–1.3)
DIAGNOSIS, 93000: NORMAL
DIFFERENTIAL METHOD BLD: ABNORMAL
EOSINOPHIL # BLD: 0.1 K/UL (ref 0–0.4)
EOSINOPHIL NFR BLD: 1 % (ref 0–5)
ERYTHROCYTE [DISTWIDTH] IN BLOOD BY AUTOMATED COUNT: 14.6 % (ref 11.6–14.5)
GLOBULIN SER CALC-MCNC: 3.8 G/DL (ref 2–4)
GLUCOSE SERPL-MCNC: 75 MG/DL (ref 74–99)
HCT VFR BLD AUTO: 33.6 % (ref 35–45)
HGB BLD-MCNC: 11.2 G/DL (ref 12–16)
LYMPHOCYTES # BLD: 0.6 K/UL (ref 0.9–3.6)
LYMPHOCYTES NFR BLD: 10 % (ref 21–52)
MCH RBC QN AUTO: 28.9 PG (ref 24–34)
MCHC RBC AUTO-ENTMCNC: 33.3 G/DL (ref 31–37)
MCV RBC AUTO: 86.6 FL (ref 74–97)
MONOCYTES # BLD: 0.3 K/UL (ref 0.05–1.2)
MONOCYTES NFR BLD: 5 % (ref 3–10)
NEUTS SEG # BLD: 5 K/UL (ref 1.8–8)
NEUTS SEG NFR BLD: 84 % (ref 40–73)
P-R INTERVAL, ECG05: 172 MS
PLATELET # BLD AUTO: 81 K/UL (ref 135–420)
PLATELET COMMENTS,PCOM: ABNORMAL
POTASSIUM SERPL-SCNC: 5.3 MMOL/L (ref 3.5–5.5)
PROT SERPL-MCNC: 7.7 G/DL (ref 6.4–8.2)
Q-T INTERVAL, ECG07: 464 MS
QRS DURATION, ECG06: 88 MS
QTC CALCULATION (BEZET), ECG08: 497 MS
RBC # BLD AUTO: 3.88 M/UL (ref 4.2–5.3)
RBC MORPH BLD: ABNORMAL
SODIUM SERPL-SCNC: 139 MMOL/L (ref 136–145)
TROPONIN I SERPL-MCNC: <0.02 NG/ML (ref 0–0.04)
VENTRICULAR RATE, ECG03: 69 BPM
WBC # BLD AUTO: 6 K/UL (ref 4.6–13.2)

## 2019-03-10 PROCEDURE — 80053 COMPREHEN METABOLIC PANEL: CPT

## 2019-03-10 PROCEDURE — 96374 THER/PROPH/DIAG INJ IV PUSH: CPT

## 2019-03-10 PROCEDURE — 93005 ELECTROCARDIOGRAM TRACING: CPT

## 2019-03-10 PROCEDURE — 96375 TX/PRO/DX INJ NEW DRUG ADDON: CPT

## 2019-03-10 PROCEDURE — 96361 HYDRATE IV INFUSION ADD-ON: CPT

## 2019-03-10 PROCEDURE — 99284 EMERGENCY DEPT VISIT MOD MDM: CPT

## 2019-03-10 PROCEDURE — 74011250636 HC RX REV CODE- 250/636: Performed by: EMERGENCY MEDICINE

## 2019-03-10 PROCEDURE — 82550 ASSAY OF CK (CPK): CPT

## 2019-03-10 PROCEDURE — 74011250637 HC RX REV CODE- 250/637: Performed by: EMERGENCY MEDICINE

## 2019-03-10 PROCEDURE — 85025 COMPLETE CBC W/AUTO DIFF WBC: CPT

## 2019-03-10 RX ORDER — ACETAMINOPHEN 325 MG/1
650 TABLET ORAL
Status: COMPLETED | OUTPATIENT
Start: 2019-03-10 | End: 2019-03-10

## 2019-03-10 RX ORDER — ONDANSETRON 2 MG/ML
4 INJECTION INTRAMUSCULAR; INTRAVENOUS
Status: COMPLETED | OUTPATIENT
Start: 2019-03-10 | End: 2019-03-10

## 2019-03-10 RX ORDER — KETOROLAC TROMETHAMINE 30 MG/ML
15 INJECTION, SOLUTION INTRAMUSCULAR; INTRAVENOUS
Status: COMPLETED | OUTPATIENT
Start: 2019-03-10 | End: 2019-03-10

## 2019-03-10 RX ORDER — ONDANSETRON 4 MG/1
TABLET, ORALLY DISINTEGRATING ORAL
Qty: 10 TAB | Refills: 0 | Status: SHIPPED | OUTPATIENT
Start: 2019-03-10 | End: 2021-08-26

## 2019-03-10 RX ORDER — CYCLOBENZAPRINE HCL 10 MG
5 TABLET ORAL
Status: COMPLETED | OUTPATIENT
Start: 2019-03-10 | End: 2019-03-10

## 2019-03-10 RX ADMIN — KETOROLAC TROMETHAMINE 15 MG: 30 INJECTION INTRAMUSCULAR; INTRAVENOUS at 19:09

## 2019-03-10 RX ADMIN — ACETAMINOPHEN 650 MG: 325 TABLET ORAL at 19:07

## 2019-03-10 RX ADMIN — SODIUM CHLORIDE 1000 ML: 900 INJECTION, SOLUTION INTRAVENOUS at 15:57

## 2019-03-10 RX ADMIN — ONDANSETRON 4 MG: 2 INJECTION INTRAMUSCULAR; INTRAVENOUS at 19:09

## 2019-03-10 RX ADMIN — SODIUM CHLORIDE 1000 ML: 900 INJECTION, SOLUTION INTRAVENOUS at 17:17

## 2019-03-10 RX ADMIN — CYCLOBENZAPRINE 5 MG: 10 TABLET, FILM COATED ORAL at 19:08

## 2019-03-10 NOTE — DISCHARGE INSTRUCTIONS

## 2019-03-10 NOTE — ED PROVIDER NOTES
EMERGENCY DEPARTMENT HISTORY AND PHYSICAL EXAM    4:07 PM      Date: 3/10/2019  Patient Name: Barrington Bernard    History of Presenting Illness     Chief Complaint   Patient presents with    Abdominal Pain    Ringing in Ear         History Provided By: Patient      Additional History (Context): Barrington Bernard is a 47 y.o. female with diabetes, hypertension and breast cancer who presents with diffused abd pain onset today. She indicated that her abd pain \"feels like needles\". She has also vomited twice today, experiencing chills and weakness, tinnitus and decreased appetite (but consumes fluids). 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 Denies CP or SOB. PCP: STEFANIA Greene    Chief Complaint: Abd pain  Duration:  Today  Timing:  Not obtained at this time   Location: Diffused  Quality: Not obtained at this time   Severity: Not obtained at this time   Modifying Factors: Not obtained at this time. Associated Symptoms: Tinnitus, decreased appetite, weakness, chills, and n/v    Current Outpatient Medications   Medication Sig Dispense Refill    ondansetron (ZOFRAN ODT) 4 mg disintegrating tablet Take 1-2 tablets every 6-8 hours as needed for nausea and vomiting. 10 Tab 0    polyethylene glycol (MIRALAX) 17 gram packet Take 17 g by mouth daily.  potassium chloride SR (K-TAB) 20 mEq tablet Take 1 Tab by mouth two (2) times a day. 6 Tab 0    oxyCODONE-acetaminophen (PERCOCET) 5-325 mg per tablet Take 1 Tab by mouth every four (4) hours as needed for Pain. Max Daily Amount: 6 Tabs. 16 Tab 0    ferrous sulfate (IRON) 325 mg (65 mg iron) EC tablet Take 1 Tab by mouth two (2) times a day. 28 Tab 0    cyclobenzaprine (FLEXERIL) 5 mg tablet Take 1 Tab by mouth every twelve (12) hours as needed for Muscle Spasm(s). 90 Tab 1    ibuprofen (MOTRIN) 800 mg tablet Take 1 Tab by mouth three (3) times daily as needed for Pain.  40 Tab 0    senna-docusate (DOC-Q-LAX) 8.6-50 mg per tablet Take 1 Tab by mouth daily. 30 Tab 11    hydroCHLOROthiazide (HYDRODIURIL) 25 mg tablet Take 1 Tab by mouth daily. 90 Tab 1    metFORMIN ER (GLUCOPHAGE XR) 500 mg tablet Take 1 Tab by mouth two (2) times daily (with meals). 180 Tab 1    atorvastatin (LIPITOR) 40 mg tablet Take 1 Tab by mouth daily. 90 Tab 1    amLODIPine (NORVASC) 5 mg tablet Take 1 Tab by mouth daily. 90 Tab 1    glimepiride (AMARYL) 2 mg tablet Take 1 Tab by mouth every morning. 90 Tab 1    raNITIdine (ZANTAC) 150 mg tablet Take 1 Tab by mouth two (2) times a day. 180 Tab 3    albuterol (PROVENTIL HFA, VENTOLIN HFA, PROAIR HFA) 90 mcg/actuation inhaler Take 1 Puff by inhalation every four (4) hours as needed for Wheezing or Shortness of Breath. 1 Inhaler 0    glucose blood VI test strips (ASCENSIA AUTODISC VI, ONE TOUCH ULTRA TEST VI) strip Check glucose daily in the AM 50 Strip 0    Lancets misc Check glucose daily. One touch Ultra lancets.  48 Each 0       Past History     Past Medical History:  Past Medical History:   Diagnosis Date    Breast cancer ([facility] [address]     right breast- chemo and radiation    Diabetes ([facility] [address] 2016    GERD (gastroesophageal reflux disease)     H/O bilateral mastectomy     H/O seasonal allergies     H/O: pituitary tumor 2016    History of chemotherapy     Hyperlipidemia 2016    initial     Hypertension 2016    Nausea & vomiting     S/P radiation therapy [id number]    Subclinical hypothyroidism 2016    Thyroid nodule 2016    multiple- no suspicous concerns    Vision loss     vision demise OD, temporal hemifield loss OS       Past Surgical History:  Past Surgical History:   Procedure Laterality Date    COLONOSCOPY N/A 2/15/2017    COLONOSCOPY performed by Chilo Mckinley MD at [address] Right 2017    RIGHT BREAST EXCIONAL BIOPSY performed by Ferny Hogan MD at [address] [facility] GYN       x2    HX MASTECTOMY Bilateral 2010    HX TUMOR REMOVAL  6/6/2016    pituitary tumor    IMPLANT BREAST SILICONE/EQ Bilateral 1833       Family History:  Family History   Problem Relation Age of Onset   [address] [facility] Hypertension Mother     Diabetes Mother     Hypertension Father     Diabetes Father     Cancer Neg Hx     Stroke Neg Hx     Heart Disease Neg Hx        Social History:  Social History     Tobacco Use    Smoking status: Never Smoker    Smokeless tobacco: Never Used   Substance Use Topics    Alcohol use: No    Drug use: No       Allergies: Allergies   Allergen Reactions    Adhesive Tape-Silicones Rash    Morphine Itching         Review of Systems     Review of Systems   Constitutional: Positive for chills. HENT:        Tinnitus   Respiratory: Negative for shortness of breath. Cardiovascular: Negative for chest pain. Gastrointestinal: Positive for nausea and vomiting. Abdominal pain: Diffused. Genitourinary: Negative for dysuria. Neurological: Positive for weakness. All other systems reviewed and are negative. Physical Exam     Visit Vitals  /67   Pulse (!) 58   Temp 98.8 °F (37.1 °C)   Resp 11   SpO2 100%     Physical Exam   Constitutional: She is oriented to person, place, and time. She appears well-developed and well-nourished. HENT:   Head: Normocephalic and atraumatic. Neck: Neck supple. No JVD present. Cardiovascular: Normal rate and regular rhythm. Pulmonary/Chest: Effort normal and breath sounds normal. No respiratory distress. Abdominal: Soft. She exhibits no distension. There is tenderness (mild generalized). There is no rebound and no guarding. Musculoskeletal: She exhibits no edema. No joint tenderness   Neurological: She is alert and oriented to person, place, and time. Skin: Skin is warm and dry. No erythema.    Psychiatric: Judgment normal.         Diagnostic Study Results     Labs -  Recent Results (from the past 12 hour(s))   CBC WITH AUTOMATED DIFF    Collection Time: 03/10/19  3:22 PM   Result Value Ref Range    WBC 6.0 4.6 - 13.2 K/uL    RBC 3.88 (L) 4.20 - 5.30 M/uL    HGB 11.2 (L) 12.0 - 16.0 g/dL    HCT 33.6 (L) 35.0 - 45.0 %    MCV 86.6 74.0 - 97.0 FL    MCH 28.9 24.0 - 34.0 PG    MCHC 33.3 31.0 - 37.0 g/dL    RDW 14.6 (H) 11.6 - 14.5 %    PLATELET 81 (L) 698 - 420 K/uL    NEUTROPHILS 84 (H) 40 - 73 %    LYMPHOCYTES 10 (L) 21 - 52 %    MONOCYTES 5 3 - 10 %    EOSINOPHILS 1 0 - 5 %    BASOPHILS 0 0 - 2 %    ABS. NEUTROPHILS 5.0 1.8 - 8.0 K/UL    ABS. LYMPHOCYTES 0.6 (L) 0.9 - 3.6 K/UL    ABS. MONOCYTES 0.3 0.05 - 1.2 K/UL    ABS. EOSINOPHILS 0.1 0.0 - 0.4 K/UL    ABS. BASOPHILS 0.0 0.0 - 0.1 K/UL    DF AUTOMATED      PLATELET COMMENTS DECREASED PLATELETS      RBC COMMENTS NORMOCYTIC, NORMOCHROMIC     METABOLIC PANEL, COMPREHENSIVE    Collection Time: 03/10/19  3:22 PM   Result Value Ref Range    Sodium 139 136 - 145 mmol/L    Potassium 5.3 3.5 - 5.5 mmol/L    Chloride 108 100 - 108 mmol/L    CO2 24 21 - 32 mmol/L    Anion gap 7 3.0 - 18 mmol/L    Glucose 75 74 - 99 mg/dL    BUN 14 7.0 - 18 MG/DL    Creatinine 1.13 0.6 - 1.3 MG/DL    BUN/Creatinine ratio 12 12 - 20      GFR est AA >60 >60 ml/min/1.73m2    GFR est non-AA 51 (L) >60 ml/min/1.73m2    Calcium 8.1 (L) 8.5 - 10.1 MG/DL    Bilirubin, total 0.6 0.2 - 1.0 MG/DL    ALT (SGPT) 63 (H) 13 - 56 U/L    AST (SGOT) 90 (H) 15 - 37 U/L    Alk.  phosphatase 100 45 - 117 U/L    Protein, total 7.7 6.4 - 8.2 g/dL    Albumin 3.9 3.4 - 5.0 g/dL    Globulin 3.8 2.0 - 4.0 g/dL    A-G Ratio 1.0 0.8 - 1.7     CARDIAC PANEL,(CK, CKMB & TROPONIN)    Collection Time: 03/10/19  3:22 PM   Result Value Ref Range     (H) 26 - 192 U/L    CK - MB 1.2 <3.6 ng/ml    CK-MB Index 0.2 0.0 - 4.0 %    Troponin-I, QT <0.02 0.0 - 0.045 NG/ML   EKG, 12 LEAD, INITIAL    Collection Time: 03/10/19  4:15 PM   Result Value Ref Range    Ventricular Rate 69 BPM    Atrial Rate 69 BPM    P-R Interval 172 ms    QRS Duration 88 ms    Q-T Interval 464 ms    QTC Calculation (Bezet) 497 ms Calculated P Axis 53 degrees    Calculated R Axis 51 degrees    Calculated T Axis -110 degrees    Diagnosis       Normal sinus rhythm  Low voltage QRS  Nonspecific T wave abnormality  Prolonged QT  Abnormal ECG  When compared with ECG of 02-MAY-2017 14:14,  Nonspecific T wave abnormality has replaced inverted T waves in Inferior   leads  T wave inversion no longer evident in Anterior leads         Radiologic Studies -   No orders to display         Medical Decision Making     It should be noted that ILadi DO will be the provider of record for this patient. I reviewed the vital signs, available nursing notes, past medical history, past surgical history, family history and social history. Vital Signs-Reviewed the patient's vital signs. Pulse Oximetry Analysis -  100% on room air (Interpretation) WNL     Cardiac Monitor:  Rate: 72 bpm   Rhythm:  Normal Sinus Rhythm     EKG: Interpreted by the EP. Time Interpreted: 1615   Rate: 69   Rhythm: Normal Sinus Rhythm    Interpretation: normal axis, normal intervals, t wave inversion V4-6   Comparison: 5/2/17, unchanged    Records Reviewed: Nursing Notes and Old Medical Records (Time of Review: 4:07 PM)    ED Course: Progress Notes, Reevaluation, and Consults:      Provider Notes (Medical Decision Making): 48 y/o female presents with abd pain, vomiting and diarrhea. Pt reports generalized fatigue. Check labs, doubt need for imaging unless labs abnormal. ekg to screen for acs, although no chest pain. Doubt obstruction, not consistent with mesenteric ischemia, pt is well appearing. Pt noted to have elevated ck, will give 2nd ns bolus. 6:56 PM  Discussed results with pt and , will re-eval after IVF. 7:55 PM  Pt feeling better, ambulated without difficulty. Stable for dc home. Suspect viral process, given zofran. Discussed return precautions and expected course of illness. Pt was educated on elevated lfts and need for pcp follow up. Diagnosis     Clinical Impression:   1. Dizziness    2. Thrombocytopenia (HCC)    3. Elevated LFTs    4. Viral syndrome        Disposition: discharged    Follow-up Information    None             Medication List      START taking these medications    ondansetron 4 mg disintegrating tablet  Commonly known as:  ZOFRAN ODT  Take 1-2 tablets every 6-8 hours as needed for nausea and vomiting. ASK your doctor about these medications    albuterol 90 mcg/actuation inhaler  Commonly known as:  PROVENTIL HFA, VENTOLIN HFA, PROAIR HFA  Take 1 Puff by inhalation every four (4) hours as needed for Wheezing or Shortness of Breath. amLODIPine 5 mg tablet  Commonly known as:  NORVASC  Take 1 Tab by mouth daily. atorvastatin 40 mg tablet  Commonly known as:  LIPITOR  Take 1 Tab by mouth daily. cyclobenzaprine 5 mg tablet  Commonly known as:  FLEXERIL  Take 1 Tab by mouth every twelve (12) hours as needed for Muscle Spasm(s). ferrous sulfate 325 mg (65 mg iron) EC tablet  Commonly known as:  IRON  Take 1 Tab by mouth two (2) times a day. glimepiride 2 mg tablet  Commonly known as:  AMARYL  Take 1 Tab by mouth every morning. glucose blood VI test strips strip  Commonly known as:  ASCENSIA AUTODISC VI, ONE TOUCH ULTRA TEST VI  Check glucose daily in the AM     hydroCHLOROthiazide 25 mg tablet  Commonly known as:  HYDRODIURIL  Take 1 Tab by mouth daily. ibuprofen 800 mg tablet  Commonly known as:  MOTRIN  Take 1 Tab by mouth three (3) times daily as needed for Pain.     lancets Misc  Check glucose daily. One touch Ultra lancets. metFORMIN  mg tablet  Commonly known as:  GLUCOPHAGE XR  Take 1 Tab by mouth two (2) times daily (with meals). MIRALAX 17 gram packet  Generic drug:  polyethylene glycol     oxyCODONE-acetaminophen 5-325 mg per tablet  Commonly known as:  PERCOCET  Take 1 Tab by mouth every four (4) hours as needed for Pain. Max Daily Amount: 6 Tabs.      potassium chloride SR 20 mEq tablet  Commonly known as:  K-TAB  Take 1 Tab by mouth two (2) times a day. raNITIdine 150 mg tablet  Commonly known as:  ZANTAC  Take 1 Tab by mouth two (2) times a day. senna-docusate 8.6-50 mg per tablet  Commonly known as:  DOC-Q-LAX  Take 1 Tab by mouth daily. Where to Get Your Medications      These medications were sent to [address] [place] [address] ([address] [place] [address]    Phone:  [phone]   · ondansetron 4 mg disintegrating tablet       _______________________________       Scribe Attestation     I-Diana Kwan acting as a scribe for and in the presence of Akanksha Rajput DO      March 10, 2019 at 4:07 PM       Provider Attestation:      I personally performed the services described in the documentation, reviewed the documentation, as recorded by the scribe in my presence, and it accurately and completely records my words and actions.  March 10, 2019 at 4:07 PM - Akanksha Rajput DO        _______________________________

## 2019-03-10 NOTE — ED TRIAGE NOTES
Pt arrived to ED via EMS with CO ringing in ears, neck pain, and abd cramping.  Per EMS pt vomited and had syncopal episode PTA

## 2019-05-22 ENCOUNTER — HOSPITAL ENCOUNTER (EMERGENCY)
Age: 54
Discharge: HOME | End: 2019-05-22
Attending: EMERGENCY MEDICINE
Payer: MEDICARE

## 2019-05-22 VITALS
OXYGEN SATURATION: 98 % | DIASTOLIC BLOOD PRESSURE: 66 MMHG | SYSTOLIC BLOOD PRESSURE: 92 MMHG | RESPIRATION RATE: 11 BRPM | HEART RATE: 59 BPM

## 2019-05-22 DIAGNOSIS — K59.00 CONSTIPATION, UNSPECIFIED CONSTIPATION TYPE: Primary | ICD-10-CM

## 2019-05-22 DIAGNOSIS — M54.2 NECK PAIN: ICD-10-CM

## 2019-05-22 DIAGNOSIS — E87.6 HYPOKALEMIA: ICD-10-CM

## 2019-05-22 LAB
ALBUMIN SERPL-MCNC: 3.8 G/DL (ref 3.4–5)
ALBUMIN/GLOB SERPL: 1.1 {RATIO} (ref 0.8–1.7)
ALP SERPL-CCNC: 83 U/L (ref 45–117)
ALT SERPL-CCNC: 48 U/L (ref 13–56)
ANION GAP SERPL CALC-SCNC: 5 MMOL/L (ref 3–18)
AST SERPL-CCNC: 65 U/L (ref 15–37)
BASOPHILS # BLD: 0 K/UL (ref 0–0.06)
BASOPHILS NFR BLD: 1 % (ref 0–3)
BILIRUB SERPL-MCNC: 0.2 MG/DL (ref 0.2–1)
BUN SERPL-MCNC: 11 MG/DL (ref 7–18)
BUN/CREAT SERPL: 10 (ref 12–20)
CALCIUM SERPL-MCNC: 9 MG/DL (ref 8.5–10.1)
CHLORIDE SERPL-SCNC: 106 MMOL/L (ref 100–108)
CO2 SERPL-SCNC: 29 MMOL/L (ref 21–32)
CREAT SERPL-MCNC: 1.07 MG/DL (ref 0.6–1.3)
DIFFERENTIAL METHOD BLD: ABNORMAL
EOSINOPHIL # BLD: 0.1 K/UL (ref 0–0.4)
EOSINOPHIL NFR BLD: 3 % (ref 0–5)
ERYTHROCYTE [DISTWIDTH] IN BLOOD BY AUTOMATED COUNT: 13.9 % (ref 11.6–14.5)
GLOBULIN SER CALC-MCNC: 3.4 G/DL (ref 2–4)
GLUCOSE SERPL-MCNC: 101 MG/DL (ref 74–99)
HCT VFR BLD AUTO: 31.6 % (ref 35–45)
HGB BLD-MCNC: 10.8 G/DL (ref 12–16)
LIPASE SERPL-CCNC: 151 U/L (ref 73–393)
LYMPHOCYTES # BLD: 2 K/UL (ref 0.8–3.5)
LYMPHOCYTES NFR BLD: 57 % (ref 20–51)
MCH RBC QN AUTO: 29.3 PG (ref 24–34)
MCHC RBC AUTO-ENTMCNC: 34.2 G/DL (ref 31–37)
MCV RBC AUTO: 85.9 FL (ref 74–97)
MONOCYTES # BLD: 0.1 K/UL (ref 0–1)
MONOCYTES NFR BLD: 3 % (ref 2–9)
NEUTS SEG # BLD: 1.2 K/UL (ref 1.8–8)
NEUTS SEG NFR BLD: 36 % (ref 42–75)
PLATELET # BLD AUTO: 92 K/UL (ref 135–420)
PLATELET COMMENTS,PCOM: ABNORMAL
PMV BLD AUTO: 12.4 FL (ref 9.2–11.8)
POTASSIUM SERPL-SCNC: 3.2 MMOL/L (ref 3.5–5.5)
PROT SERPL-MCNC: 7.2 G/DL (ref 6.4–8.2)
RBC # BLD AUTO: 3.68 M/UL (ref 4.2–5.3)
RBC MORPH BLD: ABNORMAL
SODIUM SERPL-SCNC: 140 MMOL/L (ref 136–145)
WBC # BLD AUTO: 3.4 K/UL (ref 4.6–13.2)

## 2019-05-22 PROCEDURE — 99284 EMERGENCY DEPT VISIT MOD MDM: CPT

## 2019-05-22 PROCEDURE — 80053 COMPREHEN METABOLIC PANEL: CPT

## 2019-05-22 PROCEDURE — 74011250636 HC RX REV CODE- 250/636: Performed by: PHYSICIAN ASSISTANT

## 2019-05-22 PROCEDURE — 83690 ASSAY OF LIPASE: CPT

## 2019-05-22 PROCEDURE — 96374 THER/PROPH/DIAG INJ IV PUSH: CPT

## 2019-05-22 PROCEDURE — 74011250637 HC RX REV CODE- 250/637: Performed by: PHYSICIAN ASSISTANT

## 2019-05-22 PROCEDURE — 85025 COMPLETE CBC W/AUTO DIFF WBC: CPT

## 2019-05-22 RX ORDER — POTASSIUM CHLORIDE 1500 MG/1
20 TABLET, FILM COATED, EXTENDED RELEASE ORAL 2 TIMES DAILY
Qty: 6 TAB | Refills: 0 | Status: SHIPPED | OUTPATIENT
Start: 2019-05-22 | End: 2021-08-26

## 2019-05-22 RX ORDER — MAGNESIUM CITRATE
296 SOLUTION, ORAL ORAL
Status: COMPLETED | OUTPATIENT
Start: 2019-05-22 | End: 2019-05-22

## 2019-05-22 RX ORDER — POTASSIUM CHLORIDE 20 MEQ/1
40 TABLET, EXTENDED RELEASE ORAL
Status: COMPLETED | OUTPATIENT
Start: 2019-05-22 | End: 2019-05-22

## 2019-05-22 RX ORDER — CYCLOBENZAPRINE HCL 5 MG
5 TABLET ORAL
Qty: 10 TAB | Refills: 1 | Status: SHIPPED | OUTPATIENT
Start: 2019-05-22 | End: 2021-08-26

## 2019-05-22 RX ORDER — KETOROLAC TROMETHAMINE 30 MG/ML
30 INJECTION, SOLUTION INTRAMUSCULAR; INTRAVENOUS
Status: COMPLETED | OUTPATIENT
Start: 2019-05-22 | End: 2019-05-22

## 2019-05-22 RX ADMIN — POTASSIUM CHLORIDE 40 MEQ: 1500 TABLET, EXTENDED RELEASE ORAL at 09:25

## 2019-05-22 RX ADMIN — MAGNESIUM CITRATE 296 ML: 1.75 LIQUID ORAL at 08:20

## 2019-05-22 RX ADMIN — KETOROLAC TROMETHAMINE 30 MG: 30 INJECTION, SOLUTION INTRAMUSCULAR at 08:40

## 2019-05-22 NOTE — ED PROVIDER NOTES
EMERGENCY DEPARTMENT HISTORY AND PHYSICAL EXAM      Date: 5/22/2019  Patient Name: Dianelys Haas    History of Presenting Illness     Chief Complaint   Patient presents with    Abdominal Pain    Ear Pain       HPI: Dianelys Haas, 47 y.o. female  presents to the ED planes of constipation for 1 week and abdominal pain starting last night. Patient reports her last bowel movement was 1 week ago. She reports having similar symptoms in the past but does not know what was given to relieve constipation. Patient has tried over-the-counter medication but does not remember what she tried. Patient reports she is still passing gas. She complains of severe lower abdominal pain which woke her up from sleep last night. Pain is constant, nonradiating. No urinary symptoms, no nausea, vomiting or diarrhea. There are no other complaints, changes, or physical findings at this time. PCP: STEFANIA Nicholson      Past History     Past Medical History:  Past Medical History:   Diagnosis Date    Breast cancer ([facility] [address] 2010    right breast- chemo and radiation    Diabetes ([facility] [address] 4/2016    GERD (gastroesophageal reflux disease)     H/O bilateral mastectomy 2010    H/O seasonal allergies     H/O: pituitary tumor 06/2016    History of chemotherapy     Hyperlipidemia 05/2016    initial     Hypertension 4/2016    Nausea & vomiting     S/P radiation therapy [id number]    Subclinical hypothyroidism 05/2016    Thyroid nodule 09/2016    multiple- no suspicous concerns    Vision loss     vision demise OD, temporal hemifield loss OS       Medications:  No current facility-administered medications on file prior to encounter. Current Outpatient Medications on File Prior to Encounter   Medication Sig Dispense Refill    ondansetron (ZOFRAN ODT) 4 mg disintegrating tablet Take 1-2 tablets every 6-8 hours as needed for nausea and vomiting.  10 Tab 0    polyethylene glycol (MIRALAX) 17 gram packet Take 17 g by mouth daily.  oxyCODONE-acetaminophen (PERCOCET) 5-325 mg per tablet Take 1 Tab by mouth every four (4) hours as needed for Pain. Max Daily Amount: 6 Tabs. 16 Tab 0    ferrous sulfate (IRON) 325 mg (65 mg iron) EC tablet Take 1 Tab by mouth two (2) times a day. 28 Tab 0    ibuprofen (MOTRIN) 800 mg tablet Take 1 Tab by mouth three (3) times daily as needed for Pain. 40 Tab 0    senna-docusate (DOC-Q-LAX) 8.6-50 mg per tablet Take 1 Tab by mouth daily. 30 Tab 11    hydroCHLOROthiazide (HYDRODIURIL) 25 mg tablet Take 1 Tab by mouth daily. 90 Tab 1    metFORMIN ER (GLUCOPHAGE XR) 500 mg tablet Take 1 Tab by mouth two (2) times daily (with meals). 180 Tab 1    atorvastatin (LIPITOR) 40 mg tablet Take 1 Tab by mouth daily. 90 Tab 1    amLODIPine (NORVASC) 5 mg tablet Take 1 Tab by mouth daily. 90 Tab 1    glimepiride (AMARYL) 2 mg tablet Take 1 Tab by mouth every morning. 90 Tab 1    raNITIdine (ZANTAC) 150 mg tablet Take 1 Tab by mouth two (2) times a day. 180 Tab 3    albuterol (PROVENTIL HFA, VENTOLIN HFA, PROAIR HFA) 90 mcg/actuation inhaler Take 1 Puff by inhalation every four (4) hours as needed for Wheezing or Shortness of Breath. 1 Inhaler 0    glucose blood VI test strips (ASCENSIA AUTODISC VI, ONE TOUCH ULTRA TEST VI) strip Check glucose daily in the AM 50 Strip 0    Lancets misc Check glucose daily. One touch Ultra lancets.  48 Each 0       Past Surgical History:  Past Surgical History:   Procedure Laterality Date    COLONOSCOPY N/A 2/15/2017    COLONOSCOPY performed by Magaly Kauffman MD at [address] HX BREAST BIOPSY Right 2017    RIGHT BREAST EXCIONAL BIOPSY performed by Faye Johnson MD at [address] [place] [address] HX GYN       x2    HX MASTECTOMY Bilateral     HX TUMOR REMOVAL  2016    pituitary tumor    IMPLANT BREAST SILICONE/EQ Bilateral 4306       Family History:  Family History   Problem Relation Age of Onset    Hypertension Mother     Diabetes Mother  Hypertension Father     Diabetes Father     Cancer Neg Hx     Stroke Neg Hx     Heart Disease Neg Hx        Social History:  Social History     Tobacco Use    Smoking status: Never Smoker    Smokeless tobacco: Never Used   Substance Use Topics    Alcohol use: No    Drug use: No       Allergies: Allergies   Allergen Reactions    Adhesive Tape-Silicones Rash    Morphine Itching         Review of Systems   Review of Systems   Constitutional: Negative for chills and fever. Respiratory: Negative for cough and shortness of breath. Cardiovascular: Negative for chest pain and palpitations. Gastrointestinal: Positive for abdominal pain and constipation. Negative for nausea and vomiting. Genitourinary: Negative for difficulty urinating, dysuria and flank pain. Musculoskeletal: Negative for back pain and myalgias. Skin: Negative. Allergic/Immunologic: Negative. Neurological: Negative for dizziness, weakness, numbness and headaches. Psychiatric/Behavioral: Negative. Physical Exam   Physical Exam   Constitutional: She is oriented to person, place, and time. She appears well-developed and well-nourished. She is cooperative. Non-toxic appearance. She does not have a sickly appearance. She appears distressed. Patient in distress due to pain   HENT:   Head: Normocephalic and atraumatic. Right Ear: External ear normal.   Left Ear: External ear normal.   Nose: Nose normal.   Mouth/Throat: Mucous membranes are normal.   Eyes: Conjunctivae, EOM and lids are normal. No scleral icterus. Neck: Normal range of motion. Neck supple. Cardiovascular: Normal rate, regular rhythm and normal heart sounds. Pulmonary/Chest: Effort normal and breath sounds normal. No respiratory distress. Abdominal: Soft. Normal appearance. There is tenderness in the suprapubic area. There is no rigidity, no rebound and no guarding. Musculoskeletal: Normal range of motion. She exhibits no edema. Neurological: She is alert and oriented to person, place, and time. She exhibits normal muscle tone. Skin: Skin is warm and intact. No rash noted. Psychiatric: She has a normal mood and affect. Her speech is normal and behavior is normal. Judgment and thought content normal. Cognition and memory are normal.   Nursing note and vitals reviewed. Diagnostic Study Results     Labs -     Recent Results (from the past 12 hour(s))   CBC WITH AUTOMATED DIFF    Collection Time: 05/22/19  7:13 AM   Result Value Ref Range    WBC 3.4 (L) 4.6 - 13.2 K/uL    RBC 3.68 (L) 4.20 - 5.30 M/uL    HGB 10.8 (L) 12.0 - 16.0 g/dL    HCT 31.6 (L) 35.0 - 45.0 %    MCV 85.9 74.0 - 97.0 FL    MCH 29.3 24.0 - 34.0 PG    MCHC 34.2 31.0 - 37.0 g/dL    RDW 13.9 11.6 - 14.5 %    PLATELET 92 (L) 193 - 420 K/uL    MPV 12.4 (H) 9.2 - 11.8 FL    NEUTROPHILS 36 (L) 42 - 75 %    LYMPHOCYTES 57 (H) 20 - 51 %    MONOCYTES 3 2 - 9 %    EOSINOPHILS 3 0 - 5 %    BASOPHILS 1 0 - 3 %    ABS. NEUTROPHILS 1.2 (L) 1.8 - 8.0 K/UL    ABS. LYMPHOCYTES 2.0 0.8 - 3.5 K/UL    ABS. MONOCYTES 0.1 0 - 1.0 K/UL    ABS. EOSINOPHILS 0.1 0.0 - 0.4 K/UL    ABS. BASOPHILS 0.0 0.0 - 0.06 K/UL    DF MANUAL      PLATELET COMMENTS DECREASED PLATELETS      RBC COMMENTS NORMOCYTIC, NORMOCHROMIC     METABOLIC PANEL, COMPREHENSIVE    Collection Time: 05/22/19  7:13 AM   Result Value Ref Range    Sodium 140 136 - 145 mmol/L    Potassium 3.2 (L) 3.5 - 5.5 mmol/L    Chloride 106 100 - 108 mmol/L    CO2 29 21 - 32 mmol/L    Anion gap 5 3.0 - 18 mmol/L    Glucose 101 (H) 74 - 99 mg/dL    BUN 11 7.0 - 18 MG/DL    Creatinine 1.07 0.6 - 1.3 MG/DL    BUN/Creatinine ratio 10 (L) 12 - 20      GFR est AA >60 >60 ml/min/1.73m2    GFR est non-AA 54 (L) >60 ml/min/1.73m2    Calcium 9.0 8.5 - 10.1 MG/DL    Bilirubin, total 0.2 0.2 - 1.0 MG/DL    ALT (SGPT) 48 13 - 56 U/L    AST (SGOT) 65 (H) 15 - 37 U/L    Alk.  phosphatase 83 45 - 117 U/L    Protein, total 7.2 6.4 - 8.2 g/dL    Albumin 3.8 3.4 - 5.0 g/dL    Globulin 3.4 2.0 - 4.0 g/dL    A-G Ratio 1.1 0.8 - 1.7     LIPASE    Collection Time: 05/22/19  7:13 AM   Result Value Ref Range    Lipase 151 73 - 393 U/L       Radiologic Studies -   No orders to display     CT Results  (Last 48 hours)    None        CXR Results  (Last 48 hours)    None            Medical Decision Making     Vital Signs-Reviewed the patient's vital signs. Patient Vitals for the past 12 hrs:   Pulse Resp BP SpO2   05/22/19 0845    98 %   05/22/19 0830 (!) 59 11 92/66 100 %   05/22/19 0815 61 18 100/63 98 %       I reviewed the vital signs, available nursing notes, past medical history, past surgical history, family history and social history. Provider Notes (Medical Decision Making):   Constipation x 7 days, able to pass gas but no BM. Lower abd pain. Hx of similar in past.  Offered pt enema but declined. Give Mag Citrate, check labs and re-evaluate    Consult/Progress note:  8:58 AM  Pt had large BM while on bed pan. Pt still drinking Mag Citrate. She c/o tinnitus in Rt ear with intermittent dizziness/vertigo for months. Pt seen for same in March. Ears examined, TMs normal.    9:45 AM Pt reports feeling well enough to go home. She was given KCL po and will get Rx for more. She denies wanting to wait for UA, states she is unable to give a urine sample. Pt also states she has a hx of neck pain/stiffness and was taking Flexeril which helped but she ran out. Pt wants refill of Flexeril. Will give 10 pills and pt will follow up with PCP. Pt's son at bedside, all questions answered, patient and son agree with plan    ED Course:   Initial assessment performed. The patients presenting problems have been discussed, and they are in agreement with the care plan formulated and outlined with them. I have encouraged them to ask questions as they arise throughout their visit. Diagnosis     Clinical Impression:   1.  Constipation, unspecified constipation type 2. Hypokalemia    3. Neck pain        Disposition:  home    PLAN:  1. Current Discharge Medication List      CONTINUE these medications which have CHANGED    Details   potassium chloride SR (K-TAB) 20 mEq tablet Take 1 Tab by mouth two (2) times a day. Qty: 6 Tab, Refills: 0      cyclobenzaprine (FLEXERIL) 5 mg tablet Take 1 Tab by mouth three (3) times daily as needed for Muscle Spasm(s). Qty: 10 Tab, Refills: 1           2. Follow-up Information     Follow up With Specialties Details Why [facility]    [facility] EMERGENCY DEPT Emergency Medicine  If symptoms worsen, As needed STEFANIA Lujan Physician Assistant Call in 2 days If symptoms worsen, As needed, for medication [address] [facility]  [address] [address]  [facility] [address]          3.  Return to ED if worse     Jennifer Nichole PA-C

## 2019-05-22 NOTE — ED TRIAGE NOTES
Patient brought in via EMS from home with complaints of abdominal that began last night and pain to her R ear.

## 2019-05-22 NOTE — DISCHARGE INSTRUCTIONS
Patient Education   Drink plenty of fluids, eat more potassium. Eat more fiber, more fresh fruits and vegetables. Return as needed or if symptoms worsen     Constipation: Care Instructions  Your Care Instructions    Constipation means that you have a hard time passing stools (bowel movements). People pass stools from 3 times a day to once every 3 days. What is normal for you may be different. Constipation may occur with pain in the rectum and cramping. The pain may get worse when you try to pass stools. Sometimes there are small amounts of bright red blood on toilet paper or the surface of stools. This is because of enlarged veins near the rectum (hemorrhoids). A few changes in your diet and lifestyle may help you avoid ongoing constipation. Your doctor may also prescribe medicine to help loosen your stool. Some medicines can cause constipation. These include pain medicines and antidepressants. Tell your doctor about all the medicines you take. Your doctor may want to make a medicine change to ease your symptoms. Follow-up care is a key part of your treatment and safety. Be sure to make and go to all appointments, and call your doctor if you are having problems. It's also a good idea to know your test results and keep a list of the medicines you take. How can you care for yourself at home? · Drink plenty of fluids, enough so that your urine is light yellow or clear like water. If you have kidney, heart, or liver disease and have to limit fluids, talk with your doctor before you increase the amount of fluids you drink. · Include high-fiber foods in your diet each day. These include fruits, vegetables, beans, and whole grains. · Get at least 30 minutes of exercise on most days of the week. Walking is a good choice. You also may want to do other activities, such as running, swimming, cycling, or playing tennis or team sports. · Take a fiber supplement, such as Citrucel or Metamucil, every day.  Read and follow all instructions on the label. · Schedule time each day for a bowel movement. A daily routine may help. Take your time having your bowel movement. · Support your feet with a small step stool when you sit on the toilet. This helps flex your hips and places your pelvis in a squatting position. · Your doctor may recommend an over-the-counter laxative to relieve your constipation. Examples are Milk of Magnesia and MiraLax. Read and follow all instructions on the label. Do not use laxatives on a long-term basis. When should you call for help? Call your doctor now or seek immediate medical care if:    · You have new or worse belly pain.     · You have new or worse nausea or vomiting.     · You have blood in your stools.    Watch closely for changes in your health, and be sure to contact your doctor if:    · Your constipation is getting worse.     · You do not get better as expected. Where can you learn more? Go to http://kvngAtheroMedkoki.info/. Enter 21 [id number] in the search box to learn more about \"Constipation: Care Instructions. \"  Current as of: September 23, 2018  Content Version: 11.9  © 2927-4238 [employer]. Care instructions adapted under license by [employer] (which disclaims liability or warranty for this information). If you have questions about a medical condition or this instruction, always ask your healthcare professional. [address] any warranty or liability for your use of this information. Patient Education        Hypokalemia: Care Instructions  Your Care Instructions    Hypokalemia (say \"pp-qc-[id number]-JAMIE-luís-uh\") is a low level of potassium. The heart, muscles, kidneys, and nervous system all need potassium to work well. This problem has many different causes. Kidney problems, diet, and medicines like diuretics and laxatives can cause it. So can vomiting or diarrhea. In some cases, cancer is the cause.  Your doctor may do tests to find the cause of your low potassium levels. You may need medicines to bring your potassium levels back to normal. You may also need regular blood tests to check your potassium. If you have very low potassium, you may need intravenous (IV) medicines. You also may need tests to check the electrical activity of your heart. Heart problems caused by low potassium levels can be very serious. Follow-up care is a key part of your treatment and safety. Be sure to make and go to all appointments, and call your doctor if you are having problems. It's also a good idea to know your test results and keep a list of the medicines you take. How can you care for yourself at home? · If your doctor recommends it, eat foods that have a lot of potassium. These include fresh fruits, juices, and vegetables. They also include nuts, beans, and milk. · Be safe with medicines. If your doctor prescribes medicines or potassium supplements, take them exactly as directed. Call your doctor if you have any problems with your medicines. · Get your potassium levels tested as often as your doctor tells you. When should you call for help? Call 911 anytime you think you may need emergency care. For example, call if:    · You feel like your heart is missing beats.  Heart problems caused by low potassium can cause death.     · You passed out (lost consciousness).     · You have a seizure.    Call your doctor now or seek immediate medical care if:    · You feel weak or unusually tired.     · You have severe arm or leg cramps.     · You have tingling or numbness.     · You feel sick to your stomach, or you vomit.     · You have belly cramps.     · You feel bloated or constipated.     · You have to urinate a lot.     · You feel very thirsty most of the time.     · You are dizzy or lightheaded, or you feel like you may faint.     · You feel depressed, or you lose touch with reality.    Watch closely for changes in your health, and be sure to contact your doctor if:    · You do not get better as expected. Where can you learn more? Go to http://kvng-koki.info/. Enter G358 in the search box to learn more about \"Hypokalemia: Care Instructions. \"  Current as of: March 14, 2018  Content Version: 11.9  © 1761-5968 [employer]. Care instructions adapted under license by [employer] (which disclaims liability or warranty for this information). If you have questions about a medical condition or this instruction, always ask your healthcare professional. [address] any warranty or liability for your use of this information.

## 2019-05-22 NOTE — ED NOTES
Pt had medium size hard bowel movement on bedpan. Pt repositioned in bed. Provider made aware. Advised pt of need for urine specimen. Will attempt to collect at later time.

## 2020-01-25 ENCOUNTER — APPOINTMENT (OUTPATIENT)
Dept: CT IMAGING | Age: 55
End: 2020-01-25
Attending: PHYSICIAN ASSISTANT
Payer: MEDICARE

## 2020-01-25 ENCOUNTER — HOSPITAL ENCOUNTER (EMERGENCY)
Age: 55
Discharge: HOME | End: 2020-01-26
Attending: EMERGENCY MEDICINE
Payer: MEDICARE

## 2020-01-25 DIAGNOSIS — R55 SYNCOPE AND COLLAPSE: ICD-10-CM

## 2020-01-25 DIAGNOSIS — R62.7 ADULT FAILURE TO THRIVE: Primary | ICD-10-CM

## 2020-01-25 DIAGNOSIS — R63.0 ANOREXIA: ICD-10-CM

## 2020-01-25 PROBLEM — G03.9 MENINGITIS: Status: ACTIVE | Noted: 2020-01-25

## 2020-01-25 PROBLEM — E23.6 PITUITARY APOPLEXY (HCC): Status: ACTIVE | Noted: 2017-03-30

## 2020-01-25 LAB
ALBUMIN SERPL-MCNC: 3.7 G/DL (ref 3.4–5)
ALBUMIN/GLOB SERPL: 1.1 {RATIO} (ref 0.8–1.7)
ALP SERPL-CCNC: 53 U/L (ref 45–117)
ALT SERPL-CCNC: 29 U/L (ref 13–56)
ANION GAP SERPL CALC-SCNC: 4 MMOL/L (ref 3–18)
APPEARANCE UR: CLEAR
AST SERPL-CCNC: 36 U/L (ref 10–38)
BACTERIA URNS QL MICRO: NEGATIVE /HPF
BASOPHILS # BLD: 0 K/UL (ref 0–0.1)
BASOPHILS NFR BLD: 0 % (ref 0–2)
BILIRUB SERPL-MCNC: 0.5 MG/DL (ref 0.2–1)
BILIRUB UR QL: NEGATIVE
BUN SERPL-MCNC: 12 MG/DL (ref 7–18)
BUN/CREAT SERPL: 10 (ref 12–20)
CALCIUM SERPL-MCNC: 9.2 MG/DL (ref 8.5–10.1)
CHLORIDE SERPL-SCNC: 106 MMOL/L (ref 100–111)
CO2 SERPL-SCNC: 29 MMOL/L (ref 21–32)
COLOR UR: YELLOW
CREAT SERPL-MCNC: 1.26 MG/DL (ref 0.6–1.3)
DIFFERENTIAL METHOD BLD: ABNORMAL
EOSINOPHIL # BLD: 0.1 K/UL (ref 0–0.4)
EOSINOPHIL NFR BLD: 1 % (ref 0–5)
EPITH CASTS URNS QL MICRO: NORMAL /LPF (ref 0–5)
ERYTHROCYTE [DISTWIDTH] IN BLOOD BY AUTOMATED COUNT: 14.1 % (ref 11.6–14.5)
FLUAV AG NPH QL IA: NEGATIVE
FLUBV AG NOSE QL IA: NEGATIVE
GLOBULIN SER CALC-MCNC: 3.5 G/DL (ref 2–4)
GLUCOSE SERPL-MCNC: 86 MG/DL (ref 74–99)
GLUCOSE UR STRIP.AUTO-MCNC: NEGATIVE MG/DL
HCT VFR BLD AUTO: 30 % (ref 35–45)
HGB BLD-MCNC: 10 G/DL (ref 12–16)
HGB UR QL STRIP: NEGATIVE
KETONES UR QL STRIP.AUTO: NEGATIVE MG/DL
LEUKOCYTE ESTERASE UR QL STRIP.AUTO: ABNORMAL
LIPASE SERPL-CCNC: 102 U/L (ref 73–393)
LYMPHOCYTES # BLD: 1.3 K/UL (ref 0.9–3.6)
LYMPHOCYTES NFR BLD: 23 % (ref 21–52)
MCH RBC QN AUTO: 28.5 PG (ref 24–34)
MCHC RBC AUTO-ENTMCNC: 33.3 G/DL (ref 31–37)
MCV RBC AUTO: 85.5 FL (ref 74–97)
MONOCYTES # BLD: 0.3 K/UL (ref 0.05–1.2)
MONOCYTES NFR BLD: 6 % (ref 3–10)
NEUTS SEG # BLD: 3.8 K/UL (ref 1.8–8)
NEUTS SEG NFR BLD: 70 % (ref 40–73)
NITRITE UR QL STRIP.AUTO: NEGATIVE
PH UR STRIP: 5 [PH] (ref 5–8)
PLATELET # BLD AUTO: 100 K/UL (ref 135–420)
PMV BLD AUTO: 13 FL (ref 9.2–11.8)
POTASSIUM SERPL-SCNC: 3.5 MMOL/L (ref 3.5–5.5)
PROT SERPL-MCNC: 7.2 G/DL (ref 6.4–8.2)
PROT UR STRIP-MCNC: NEGATIVE MG/DL
RBC # BLD AUTO: 3.51 M/UL (ref 4.2–5.3)
RBC #/AREA URNS HPF: NORMAL /HPF (ref 0–5)
SODIUM SERPL-SCNC: 139 MMOL/L (ref 136–145)
SP GR UR REFRACTOMETRY: 1.02 (ref 1–1.03)
UROBILINOGEN UR QL STRIP.AUTO: 0.2 EU/DL (ref 0.2–1)
WBC # BLD AUTO: 5.5 K/UL (ref 4.6–13.2)
WBC URNS QL MICRO: NORMAL /HPF (ref 0–5)

## 2020-01-25 PROCEDURE — 93005 ELECTROCARDIOGRAM TRACING: CPT

## 2020-01-25 PROCEDURE — 99284 EMERGENCY DEPT VISIT MOD MDM: CPT

## 2020-01-25 PROCEDURE — 83690 ASSAY OF LIPASE: CPT

## 2020-01-25 PROCEDURE — 74177 CT ABD & PELVIS W/CONTRAST: CPT

## 2020-01-25 PROCEDURE — 84484 ASSAY OF TROPONIN QUANT: CPT

## 2020-01-25 PROCEDURE — 85025 COMPLETE CBC W/AUTO DIFF WBC: CPT

## 2020-01-25 PROCEDURE — 70450 CT HEAD/BRAIN W/O DYE: CPT

## 2020-01-25 PROCEDURE — 80053 COMPREHEN METABOLIC PANEL: CPT

## 2020-01-25 PROCEDURE — 81001 URINALYSIS AUTO W/SCOPE: CPT

## 2020-01-25 PROCEDURE — 87804 INFLUENZA ASSAY W/OPTIC: CPT

## 2020-01-25 PROCEDURE — 72125 CT NECK SPINE W/O DYE: CPT

## 2020-01-26 VITALS
RESPIRATION RATE: 14 BRPM | OXYGEN SATURATION: 98 % | DIASTOLIC BLOOD PRESSURE: 64 MMHG | TEMPERATURE: 97.4 F | HEART RATE: 64 BPM | SYSTOLIC BLOOD PRESSURE: 93 MMHG

## 2020-01-26 LAB
ATRIAL RATE: 0 BPM
ATRIAL RATE: 60 BPM
CALCULATED P AXIS, ECG09: 54 DEGREES
CALCULATED R AXIS, ECG10: 14 DEGREES
CALCULATED R AXIS, ECG10: 47 DEGREES
CALCULATED T AXIS, ECG11: -135 DEGREES
CALCULATED T AXIS, ECG11: 5 DEGREES
DIAGNOSIS, 93000: NORMAL
DIAGNOSIS, 93000: NORMAL
P-R INTERVAL, ECG05: 182 MS
Q-T INTERVAL, ECG07: 410 MS
Q-T INTERVAL, ECG07: 426 MS
QRS DURATION, ECG06: 180 MS
QRS DURATION, ECG06: 88 MS
QTC CALCULATION (BEZET), ECG08: 410 MS
QTC CALCULATION (BEZET), ECG08: 466 MS
TROPONIN I SERPL-MCNC: <0.02 NG/ML (ref 0–0.04)
VENTRICULAR RATE, ECG03: 60 BPM
VENTRICULAR RATE, ECG03: 72 BPM

## 2020-01-26 PROCEDURE — 74011636320 HC RX REV CODE- 636/320: Performed by: EMERGENCY MEDICINE

## 2020-01-26 RX ADMIN — IOPAMIDOL 100 ML: 612 INJECTION, SOLUTION INTRAVENOUS at 00:27

## 2020-01-26 NOTE — ED TRIAGE NOTES
Pt reports complaints of  sharp abdominal pain,pt reports that she was having a BM and passed out while on the toilet, per pt this incident occurred prior to ED arrival and has occurred in the past. Pt presents vomiting, family , answering questions appropriately , pt appear lethargic

## 2020-01-26 NOTE — DISCHARGE INSTRUCTIONS
Patient Education        Anorexia: Care Instructions  Your Care Instructions    Anorexia is a type of eating disorder. People who have anorexia don't eat enough to stay at a normal weight. Sometimes they also exercise too much. They do these things because they're so afraid of gaining weight. They believe that they're fat, even when they're thin. Often they think that losing even more weight will solve their problems and make their lives better. If you have anorexia, it can really harm your health. This is because your body doesn't get the nutrition it needs. The first step to controlling the problem is admitting that something is wrong. Then counseling can help you change how you think about food, the way you see your body, and any other emotional issues. It may take months or years, but you can recover from anorexia. Follow-up care is a key part of your treatment and safety. Be sure to make and go to all appointments, and call your doctor if you are having problems. It's also a good idea to know your test results and keep a list of the medicines you take. How can you care for yourself at home? Follow your treatment plan  · Go to your counseling sessions. Call or talk with your counselor if you can't go or if you think the sessions aren't helping. Don't just stop going. · Take your medicines exactly as prescribed. Call your doctor if you think you are having a problem with your medicine. You will get more details on the specific medicines your doctor prescribes. Trust people who are helping you  · Listen to what counselors and nutrition experts say about healthy eating. · Learn about what is included in a balanced diet. Then discuss what you learn. · Let people know how you are feeling. Listen to how they are feeling too. · Accept support and feedback from other people. Learn to be easier on yourself  · Learn to focus on your good qualities. · If your body feels weak, slow down and do less.   · Remind yourself that feeling bad about yourself is all part of your disorder. · Remember that it takes time to recover from anorexia. · Spend time with other people doing things you enjoy. · Try to focus on one goal at a time. · Don't blame yourself for your disorder. Develop healthy eating habits  · With your doctor and counselor, you will decide on a good weight for you. You will also decide how much weight you will gain each week. · Try not to argue with the people you eat with. Arguing increases stress. And stress can make make it harder for you to relax and eat. · Talk with other people during meals about things that interest you. Don't talk about food or gaining weight. Caring for a loved one with anorexia  · Remind yourself that anorexia is a long-lasting disorder. It takes time for changes to occur. · Show love and support for your loved one, especially if he or she gets discouraged. · Support your loved one as he or she goes through the steps of recovery. Focus on wellness. Don't focus on food. · Take care of yourself. Continue with your own interests, career, hobbies, and friends. · Don't blame yourself or look for the reason for the disorder. · If you are having a hard time, talk with a counselor. · Learn what to do if your loved one relapses. When should you call for help? Call 911 anytime you think you may need emergency care. For example, call if:    · A person with anorexia seems depressed and is talking about suicide.  If the talk about suicide seems real, stay with the person, or ask someone you trust to stay with the person, until you get emergency help.     · You have a rapid or irregular heartbeat.     · You passed out (lost consciousness).    Call your doctor now or seek immediate medical care if:    · You feel hopeless or have thoughts of hurting yourself.    Watch closely for changes in your health, and be sure to contact your doctor if:    · You have trouble sleeping.     · You feel anxious or depressed. Where can you learn more? Go to http://kvng-koki.info/. Enter Q517 in the search box to learn more about \"Anorexia: Care Instructions. \"  Current as of: May 28, 2019  Content Version: 12.2  © 8796-2118 [employer]. Care instructions adapted under license by [employer]¢â€[employer]Â¢ (which disclaims liability or warranty for this information). If you have questions about a medical condition or this instruction, always ask your healthcare professional. [place] [address] any warranty or liability for your use of this information. Patient Education        Lightheadedness or Faintness: Care Instructions  Your Care Instructions  Lightheadedness is a feeling that you are about to faint or \"pass out. \" You do not feel as if you or your surroundings are moving. It is different from vertigo, which is the feeling that you or things around you are spinning or tilting. Lightheadedness usually goes away or gets better when you lie down. If lightheadedness gets worse, it can lead to a fainting spell. It is common to feel lightheaded from time to time. Lightheadedness usually is not caused by a serious problem. It often is caused by a short-lasting drop in blood pressure and blood flow to your head that occurs when you get up too quickly from a seated or lying position. Follow-up care is a key part of your treatment and safety. Be sure to make and go to all appointments, and call your doctor if you are having problems. It's also a good idea to know your test results and keep a list of the medicines you take. How can you care for yourself at home? · Lie down for 1 or 2 minutes when you feel lightheaded. After lying down, sit up slowly and remain sitting for 1 to 2 minutes before slowly standing up.   · Avoid movements, positions, or activities that have made you lightheaded in the past.  · Get plenty of rest, especially if you have a cold or flu, which can cause lightheadedness. · Make sure you drink plenty of fluids, especially if you have a fever or have been sweating. · Do not drive or put yourself and others in danger while you feel lightheaded. When should you call for help? Call 911 anytime you think you may need emergency care. For example, call if:    · You have symptoms of a stroke. These may include:  ? Sudden numbness, tingling, weakness, or loss of movement in your face, arm, or leg, especially on only one side of your body. ? Sudden vision changes. ? Sudden trouble speaking. ? Sudden confusion or trouble understanding simple statements. ? Sudden problems with walking or balance. ? A sudden, severe headache that is different from past headaches.     · You have symptoms of a heart attack. These may include:  ? Chest pain or pressure, or a strange feeling in the chest.  ? Sweating. ? Shortness of breath. ? Nausea or vomiting. ? Pain, pressure, or a strange feeling in the back, neck, jaw, or upper belly or in one or both shoulders or arms. ? Lightheadedness or sudden weakness. ? A fast or irregular heartbeat. After you call 911, the  may tell you to chew 1 adult-strength or 2 to 4 low-dose aspirin. Wait for an ambulance. Do not try to drive yourself.    Watch closely for changes in your health, and be sure to contact your doctor if:    · Your lightheadedness gets worse or does not get better with home care. Where can you learn more? Go to http://kvng-koki.info/. Enter N829 in the search box to learn more about \"Lightheadedness or Faintness: Care Instructions. \"  Current as of: June 26, 2019  Content Version: 12.2  © 3738-8854 [employer]. Care instructions adapted under license by [employer] (which disclaims liability or warranty for this information).  If you have questions about a medical condition or this instruction, always ask your healthcare professional. [employer] disclaims any warranty or liability for your use of this information.

## 2020-01-26 NOTE — ED NOTES
Pt awake and alert at this time. C/o neck pain but nothing else. Able to stand and get self dressed. I have reviewed discharge instructions with the patient. The patient verbalized understanding.

## 2021-05-03 ENCOUNTER — HOSPITAL ENCOUNTER (EMERGENCY)
Age: 56
Discharge: HOME | End: 2021-05-03
Attending: EMERGENCY MEDICINE
Payer: MEDICARE

## 2021-05-03 ENCOUNTER — APPOINTMENT (OUTPATIENT)
Dept: CT IMAGING | Age: 56
End: 2021-05-03
Attending: EMERGENCY MEDICINE
Payer: MEDICARE

## 2021-05-03 VITALS
DIASTOLIC BLOOD PRESSURE: 68 MMHG | TEMPERATURE: 98.9 F | SYSTOLIC BLOOD PRESSURE: 112 MMHG | HEIGHT: 66 IN | RESPIRATION RATE: 16 BRPM | BODY MASS INDEX: 28.61 KG/M2 | OXYGEN SATURATION: 100 % | WEIGHT: 178 LBS | HEART RATE: 76 BPM

## 2021-05-03 DIAGNOSIS — R91.8 PULMONARY NODULES: ICD-10-CM

## 2021-05-03 DIAGNOSIS — R10.84 ABDOMINAL PAIN, GENERALIZED: Primary | ICD-10-CM

## 2021-05-03 LAB
ALBUMIN SERPL-MCNC: 3.7 G/DL (ref 3.4–5)
ALBUMIN/GLOB SERPL: 0.9 {RATIO} (ref 0.8–1.7)
ALP SERPL-CCNC: 96 U/L (ref 45–117)
ALT SERPL-CCNC: 54 U/L (ref 13–56)
ANION GAP SERPL CALC-SCNC: 3 MMOL/L (ref 3–18)
APPEARANCE UR: CLEAR
AST SERPL-CCNC: 77 U/L (ref 10–38)
BASOPHILS # BLD: 0 K/UL (ref 0–0.1)
BASOPHILS NFR BLD: 0 % (ref 0–2)
BILIRUB SERPL-MCNC: 0.9 MG/DL (ref 0.2–1)
BILIRUB UR QL: NEGATIVE
BUN SERPL-MCNC: 11 MG/DL (ref 7–18)
BUN/CREAT SERPL: 8 (ref 12–20)
CALCIUM SERPL-MCNC: 9.1 MG/DL (ref 8.5–10.1)
CHLORIDE SERPL-SCNC: 104 MMOL/L (ref 100–111)
CO2 SERPL-SCNC: 30 MMOL/L (ref 21–32)
COLOR UR: YELLOW
CREAT SERPL-MCNC: 1.33 MG/DL (ref 0.6–1.3)
DIFFERENTIAL METHOD BLD: ABNORMAL
EOSINOPHIL # BLD: 0.1 K/UL (ref 0–0.4)
EOSINOPHIL NFR BLD: 1 % (ref 0–5)
ERYTHROCYTE [DISTWIDTH] IN BLOOD BY AUTOMATED COUNT: 14.8 % (ref 11.6–14.5)
GLOBULIN SER CALC-MCNC: 4.1 G/DL (ref 2–4)
GLUCOSE SERPL-MCNC: 103 MG/DL (ref 74–99)
GLUCOSE UR STRIP.AUTO-MCNC: NEGATIVE MG/DL
HCT VFR BLD AUTO: 28.3 % (ref 35–45)
HGB BLD-MCNC: 9.5 G/DL (ref 12–16)
HGB UR QL STRIP: NEGATIVE
KETONES UR QL STRIP.AUTO: NEGATIVE MG/DL
LEUKOCYTE ESTERASE UR QL STRIP.AUTO: NEGATIVE
LYMPHOCYTES # BLD: 1.1 K/UL (ref 0.9–3.6)
LYMPHOCYTES NFR BLD: 13 % (ref 21–52)
MCH RBC QN AUTO: 29.4 PG (ref 24–34)
MCHC RBC AUTO-ENTMCNC: 33.6 G/DL (ref 31–37)
MCV RBC AUTO: 87.6 FL (ref 74–97)
MONOCYTES # BLD: 0.3 K/UL (ref 0.05–1.2)
MONOCYTES NFR BLD: 4 % (ref 3–10)
NEUTS SEG # BLD: 6.7 K/UL (ref 1.8–8)
NEUTS SEG NFR BLD: 82 % (ref 40–73)
NITRITE UR QL STRIP.AUTO: NEGATIVE
PH UR STRIP: 7 [PH] (ref 5–8)
PLATELET # BLD AUTO: 102 K/UL (ref 135–420)
PMV BLD AUTO: 13.2 FL (ref 9.2–11.8)
POTASSIUM SERPL-SCNC: 3.2 MMOL/L (ref 3.5–5.5)
PROT SERPL-MCNC: 7.8 G/DL (ref 6.4–8.2)
PROT UR STRIP-MCNC: NEGATIVE MG/DL
RBC # BLD AUTO: 3.23 M/UL (ref 4.2–5.3)
SODIUM SERPL-SCNC: 137 MMOL/L (ref 136–145)
SP GR UR REFRACTOMETRY: 1.01 (ref 1–1.03)
UROBILINOGEN UR QL STRIP.AUTO: 2 EU/DL (ref 0.2–1)
WBC # BLD AUTO: 8.2 K/UL (ref 4.6–13.2)

## 2021-05-03 PROCEDURE — 74177 CT ABD & PELVIS W/CONTRAST: CPT

## 2021-05-03 PROCEDURE — 99284 EMERGENCY DEPT VISIT MOD MDM: CPT

## 2021-05-03 PROCEDURE — 74011250637 HC RX REV CODE- 250/637: Performed by: EMERGENCY MEDICINE

## 2021-05-03 PROCEDURE — 74011000636 HC RX REV CODE- 636: Performed by: EMERGENCY MEDICINE

## 2021-05-03 PROCEDURE — 80053 COMPREHEN METABOLIC PANEL: CPT

## 2021-05-03 PROCEDURE — 81003 URINALYSIS AUTO W/O SCOPE: CPT

## 2021-05-03 PROCEDURE — 85025 COMPLETE CBC W/AUTO DIFF WBC: CPT

## 2021-05-03 RX ORDER — POTASSIUM CHLORIDE 20 MEQ/1
40 TABLET, EXTENDED RELEASE ORAL
Status: COMPLETED | OUTPATIENT
Start: 2021-05-03 | End: 2021-05-03

## 2021-05-03 RX ADMIN — IOPAMIDOL 80 ML: 612 INJECTION, SOLUTION INTRAVENOUS at 17:17

## 2021-05-03 RX ADMIN — POTASSIUM CHLORIDE 40 MEQ: 1500 TABLET, EXTENDED RELEASE ORAL at 16:56

## 2021-05-03 NOTE — ED PROVIDER NOTES
EMERGENCY DEPARTMENT HISTORY AND PHYSICAL EXAM  This was created with voice recognition software and transcription errors may be present. 1:49 PM  Date: 5/3/2021  Patient Name: Beau Garcia    History of Presenting Illness     Chief Complaint:    History Provided By:     HPI: Beau Garcia is a 48 y.o. female past medical history of breast cancer reflux bilateral mastectomy chemo hyperlipidemia hypertension subclinical hypothyroidism who presents for abdominal pain patient started around 8 PM last night no associated nausea vomiting or diarrhea no chest pain or shortness of breath with lower abdominal pain. No dysuria. Patient called EMS this morning and states it essentially resolved and they gave her D50 she states that her blood glucose was in the 50s and then is improved.   She states it is unusual but that is how it happened aggravating or alleviating factors no other associated symptoms  PCP: STEFANIA Reid      Past History     Past Medical History:  Past Medical History:   Diagnosis Date    Breast cancer ([facility] [address]     right breast- chemo and radiation    Diabetes ([facility] [address] 2016    GERD (gastroesophageal reflux disease)     H/O bilateral mastectomy     H/O seasonal allergies     H/O: pituitary tumor 2016    History of chemotherapy     Hyperlipidemia 2016    initial     Hypertension 2016    Nausea & vomiting     S/P radiation therapy [id number]    Subclinical hypothyroidism 2016    Thyroid nodule 2016    multiple- no suspicous concerns    Vision loss     vision demise OD, temporal hemifield loss OS       Past Surgical History:  Past Surgical History:   Procedure Laterality Date    COLONOSCOPY N/A 2/15/2017    COLONOSCOPY performed by Wu Cerrato MD at [address] [facility] Right 2017    RIGHT BREAST EXCIONAL BIOPSY performed by Jimena Lewis MD at [address] HX GYN       x2    HX MASTECTOMY Bilateral   HX TUMOR REMOVAL  6/6/2016    pituitary tumor    IMPLANT BREAST SILICONE/EQ Bilateral 7733       Family History:  Family History   Problem Relation Age of Onset   [facility] Hypertension Mother     Diabetes Mother     Hypertension Father     Diabetes Father     Cancer Neg Hx     Stroke Neg Hx     Heart Disease Neg Hx        Social History:  Social History     Tobacco Use    Smoking status: Never Smoker    Smokeless tobacco: Never Used   Substance Use Topics    Alcohol use: No    Drug use: No       Allergies: Allergies   Allergen Reactions    Adhesive Tape-Silicones Rash    Morphine Itching       Review of Systems     Review of Systems   All other systems reviewed and are negative. 10 point review of systems otherwise negative unless noted in HPI. Physical Exam       Physical Exam  Constitutional:       Appearance: She is well-developed. HENT:      Head: Normocephalic and atraumatic. Eyes:      Pupils: Pupils are equal, round, and reactive to light. Neck:      Musculoskeletal: Normal range of motion and neck supple. Cardiovascular:      Rate and Rhythm: Normal rate and regular rhythm. Heart sounds: Normal heart sounds. No murmur. No friction rub. Pulmonary:      Effort: Pulmonary effort is normal. No respiratory distress. Breath sounds: Normal breath sounds. No wheezing. Abdominal:      General: There is no distension. Palpations: Abdomen is soft. Tenderness: There is no abdominal tenderness. There is no guarding or rebound. Musculoskeletal: Normal range of motion. Skin:     General: Skin is warm and dry. Neurological:      Mental Status: She is alert and oriented to person, place, and time. Psychiatric:         Behavior: Behavior normal.         Thought Content:  Thought content normal.         Diagnostic Study Results     Vital Signs  EKG:  Labs:   Imaging:     Medical Decision Making     ED Course: Progress Notes, Reevaluation, and Consults:      Provider Notes (Medical Decision Making): Patient presents with lower abdominal pain that has now resolved. She states it started last night and resolved upon administration of glucose certainly may be from hypoglycemia it would be slightly unusual but patient feels better and this remained resolved. Will check basic labs and urine    Patient's pain is returned so will obtain a CT         Diagnosis     Clinical Impression: No diagnosis found. Disposition:    Patient's Medications   Start Taking    No medications on file   Continue Taking    ALBUTEROL (PROVENTIL HFA, VENTOLIN HFA, PROAIR HFA) 90 MCG/ACTUATION INHALER    Take 1 Puff by inhalation every four (4) hours as needed for Wheezing or Shortness of Breath. AMLODIPINE (NORVASC) 5 MG TABLET    Take 1 Tab by mouth daily. ATORVASTATIN (LIPITOR) 40 MG TABLET    Take 1 Tab by mouth daily. CYCLOBENZAPRINE (FLEXERIL) 5 MG TABLET    Take 1 Tab by mouth three (3) times daily as needed for Muscle Spasm(s). FERROUS SULFATE (IRON) 325 MG (65 MG IRON) EC TABLET    Take 1 Tab by mouth two (2) times a day. GLIMEPIRIDE (AMARYL) 2 MG TABLET    Take 1 Tab by mouth every morning. GLUCOSE BLOOD VI TEST STRIPS (ASCENSIA AUTODISC VI, ONE TOUCH ULTRA TEST VI) STRIP    Check glucose daily in the AM    HYDROCHLOROTHIAZIDE (HYDRODIURIL) 25 MG TABLET    Take 1 Tab by mouth daily. IBUPROFEN (MOTRIN) 800 MG TABLET    Take 1 Tab by mouth three (3) times daily as needed for Pain. LANCETS MISC    Check glucose daily. One touch Ultra lancets. METFORMIN ER (GLUCOPHAGE XR) 500 MG TABLET    Take 1 Tab by mouth two (2) times daily (with meals). ONDANSETRON (ZOFRAN ODT) 4 MG DISINTEGRATING TABLET    Take 1-2 tablets every 6-8 hours as needed for nausea and vomiting. OXYCODONE-ACETAMINOPHEN (PERCOCET) 5-325 MG PER TABLET    Take 1 Tab by mouth every four (4) hours as needed for Pain. Max Daily Amount: 6 Tabs.     POLYETHYLENE GLYCOL (MIRALAX) 17 GRAM PACKET    Take 17 g by mouth daily.    POTASSIUM CHLORIDE SR (K-TAB) 20 MEQ TABLET    Take 1 Tab by mouth two (2) times a day. RANITIDINE (ZANTAC) 150 MG TABLET    Take 1 Tab by mouth two (2) times a day. SENNA-DOCUSATE (DOC-Q-LAX) 8.6-50 MG PER TABLET    Take 1 Tab by mouth daily.    These Medications have changed    No medications on file   Stop Taking    No medications on file

## 2021-05-03 NOTE — ED NOTES
Patient endorsed to me as a signout from Dr. Zoie Lange at 1700 pending completion of CT abdomen/pelvis and reassessment. CT of the abdomen/pelvis demonstrates:  IMPRESSION     New multifocal nodular opacities of the lung bases may represent metastatic  disease. Alternatively, infectious/inflammatory process in the appropriate  clinical setting. Short-term follow-up chest CT is recommended.     Mild pericardial effusion.     Hepatic steatosis.     Nonspecific gastric wall thickening, correlate for gastritis. Upon reevaluation, patient is completely pain-free and requesting be discharged home. Given her history of breast cancer, she was notified of the concerning CT findings of nodular opacities at the bilateral lung bases which may represent metastatic disease. She was strongly encouraged to follow-up with her primary doctor as outpatient for CT chest and further work-up. She is stable for discharge home. Pt has been reexamined. Patient has no new complaints, changes, or physical findings. Care plan outlined and precautions discussed. Results were reviewed with the patient. All medications were reviewed with the patient; will d/c home with PMD f/u. All of pt's questions and concerns were addressed. Patient was instructed and agrees to follow up with PMD, as well as to return to the ED upon further deterioration. Patient is ready to go home. This note was dictated utilizing voice recognition software which may lead to typographical errors. I apologize in advance if the situation occurs. If questions arise please do not hesitate to contact me or call our department.     Dory Otero, DO

## 2021-05-03 NOTE — ED TRIAGE NOTES
Pt arrived EMS from home for abdominal pain starting last night at 10pm.  Pt reports pain cramping 6 out of 10 with shooting pain 10 out of 10. Hx of abdominal pain with no diagnosis. Chronic abdominal pain started after breast cancer. Hx of diabetes and currently not diabetic. Blood glucose 58 in the field.   EMS administered d50 and pt glucose 106 prior to [facility] ED arrival.

## 2021-08-26 RX ORDER — ROSUVASTATIN CALCIUM 40 MG/1
40 TABLET, COATED ORAL
COMMUNITY

## 2021-08-26 RX ORDER — MECLIZINE HCL 12.5 MG 12.5 MG/1
12.5 TABLET ORAL
COMMUNITY

## 2021-08-26 NOTE — PERIOP NOTES
PRE-SURGICAL INSTRUCTIONS        Patient's Name:  Ninfa Sykes      Today's Date:  8/26/2021              Surgery Date:  9/2/2021                1. Do NOT eat or drink anything, including candy, gum, or ice chips after midnight on 9/1, unless you have specific instructions from your surgeon or anesthesia provider to do so.  2. You may brush your teeth before coming to the hospital.  3. No smoking 24 hours prior to the day of surgery. 4. No alcohol 24 hours prior to the day of surgery. 5. No recreational drugs for one week prior to the day of surgery. 6. Leave all valuables, including money/purse, at home. 7. Remove all jewelry, nail polish, acrylic nails, and makeup (including mascara); no lotions powders, deodorant, or perfume/cologne/after shave on the skin. 8. Glasses/contact lenses and dentures may be worn to the hospital.  They will be removed prior to surgery. 9. Call your doctor if symptoms of a cold or illness develop within 24-48 hours prior to your surgery. 10.  AN ADULT MUST DRIVE YOU HOME AFTER OUTPATIENT SURGERY. 11.  If you are having an outpatient procedure, please make arrangements for a responsible adult to be with you for 24 hours after your surgery. 12.  One person may accompany you. Everyone must wear a mask and social distance. Special Instructions:      Bring any pertinent legal medical records. Take these medications the morning of surgery with a sip of water:  NONE         On the day of surgery, come in the main entrance of [facility]. Let the  at the desk know you are there for surgery. A staff member will come escort you to the surgical area on the second floor. If you have any questions or concerns, please do not hesitate to call:     (Prior to the day of surgery) PAT department:  [phone]   (Day of surgery) Pre-Op department:  [phone]    These surgical instructions were reviewed with Anupam Davila during the PAT phone call.

## 2021-08-27 ENCOUNTER — HOSPITAL ENCOUNTER (OUTPATIENT)
Dept: PREADMISSION TESTING | Age: 56
Discharge: HOME | End: 2021-08-27
Payer: MEDICARE

## 2021-08-27 PROCEDURE — U0005 INFEC AGEN DETEC AMPLI PROBE: HCPCS

## 2021-08-28 LAB — SARS-COV-2, COV2NT: NOT DETECTED

## 2021-09-01 ENCOUNTER — ANESTHESIA EVENT (OUTPATIENT)
Dept: ENDOSCOPY | Age: 56
End: 2021-09-01
Payer: MEDICARE

## 2021-09-02 ENCOUNTER — HOSPITAL ENCOUNTER (OUTPATIENT)
Age: 56
Setting detail: OUTPATIENT SURGERY
Discharge: HOME | End: 2021-09-02
Attending: INTERNAL MEDICINE | Admitting: INTERNAL MEDICINE
Payer: MEDICARE

## 2021-09-02 ENCOUNTER — ANESTHESIA (OUTPATIENT)
Dept: ENDOSCOPY | Age: 56
End: 2021-09-02
Payer: MEDICARE

## 2021-09-02 VITALS
OXYGEN SATURATION: 100 % | TEMPERATURE: 97.8 F | WEIGHT: 160 LBS | BODY MASS INDEX: 26.66 KG/M2 | HEART RATE: 68 BPM | HEIGHT: 65 IN | RESPIRATION RATE: 16 BRPM | SYSTOLIC BLOOD PRESSURE: 125 MMHG | DIASTOLIC BLOOD PRESSURE: 89 MMHG

## 2021-09-02 LAB — HCG UR QL: NEGATIVE

## 2021-09-02 PROCEDURE — 74011000250 HC RX REV CODE- 250: Performed by: ANESTHESIOLOGY

## 2021-09-02 PROCEDURE — 00731 ANES UPR GI NDSC PX NOS: CPT | Performed by: NURSE ANESTHETIST, CERTIFIED REGISTERED

## 2021-09-02 PROCEDURE — 88342 IMHCHEM/IMCYTCHM 1ST ANTB: CPT

## 2021-09-02 PROCEDURE — 76040000019: Performed by: INTERNAL MEDICINE

## 2021-09-02 PROCEDURE — 81025 URINE PREGNANCY TEST: CPT

## 2021-09-02 PROCEDURE — 77030008565 HC TBNG SUC IRR ERBE -B: Performed by: INTERNAL MEDICINE

## 2021-09-02 PROCEDURE — 77030019988 HC FCPS ENDOSC DISP BSC -B: Performed by: INTERNAL MEDICINE

## 2021-09-02 PROCEDURE — 2709999900 HC NON-CHARGEABLE SUPPLY: Performed by: INTERNAL MEDICINE

## 2021-09-02 PROCEDURE — 74011250636 HC RX REV CODE- 250/636: Performed by: NURSE ANESTHETIST, CERTIFIED REGISTERED

## 2021-09-02 PROCEDURE — 76060000031 HC ANESTHESIA FIRST 0.5 HR: Performed by: INTERNAL MEDICINE

## 2021-09-02 PROCEDURE — 88305 TISSUE EXAM BY PATHOLOGIST: CPT

## 2021-09-02 PROCEDURE — 00731 ANES UPR GI NDSC PX NOS: CPT | Performed by: ANESTHESIOLOGY

## 2021-09-02 PROCEDURE — 74011000250 HC RX REV CODE- 250: Performed by: NURSE ANESTHETIST, CERTIFIED REGISTERED

## 2021-09-02 PROCEDURE — 74011250636 HC RX REV CODE- 250/636: Performed by: ANESTHESIOLOGY

## 2021-09-02 RX ORDER — PROPOFOL 10 MG/ML
INJECTION, EMULSION INTRAVENOUS AS NEEDED
Status: DISCONTINUED | OUTPATIENT
Start: 2021-09-02 | End: 2021-09-02 | Stop reason: HOSPADM

## 2021-09-02 RX ORDER — SODIUM CHLORIDE, SODIUM LACTATE, POTASSIUM CHLORIDE, CALCIUM CHLORIDE 600; 310; 30; 20 MG/100ML; MG/100ML; MG/100ML; MG/100ML
25 INJECTION, SOLUTION INTRAVENOUS CONTINUOUS
Status: DISCONTINUED | OUTPATIENT
Start: 2021-09-02 | End: 2021-09-02 | Stop reason: HOSPADM

## 2021-09-02 RX ORDER — LIDOCAINE HYDROCHLORIDE 20 MG/ML
INJECTION, SOLUTION EPIDURAL; INFILTRATION; INTRACAUDAL; PERINEURAL AS NEEDED
Status: DISCONTINUED | OUTPATIENT
Start: 2021-09-02 | End: 2021-09-02 | Stop reason: HOSPADM

## 2021-09-02 RX ORDER — INSULIN LISPRO 100 [IU]/ML
INJECTION, SOLUTION INTRAVENOUS; SUBCUTANEOUS ONCE
Status: DISCONTINUED | OUTPATIENT
Start: 2021-09-02 | End: 2021-09-02 | Stop reason: HOSPADM

## 2021-09-02 RX ADMIN — LIDOCAINE HYDROCHLORIDE 40 MG: 20 INJECTION, SOLUTION EPIDURAL; INFILTRATION; INTRACAUDAL; PERINEURAL at 10:52

## 2021-09-02 RX ADMIN — PROPOFOL 10 MG: 10 INJECTION, EMULSION INTRAVENOUS at 10:57

## 2021-09-02 RX ADMIN — SODIUM CHLORIDE, SODIUM LACTATE, POTASSIUM CHLORIDE, AND CALCIUM CHLORIDE 25 ML/HR: 600; 310; 30; 20 INJECTION, SOLUTION INTRAVENOUS at 09:40

## 2021-09-02 RX ADMIN — PROPOFOL 50 MG: 10 INJECTION, EMULSION INTRAVENOUS at 10:52

## 2021-09-02 RX ADMIN — FAMOTIDINE 20 MG: 10 INJECTION INTRAVENOUS at 09:57

## 2021-09-02 NOTE — DISCHARGE INSTRUCTIONS
Upper GI Endoscopy: What to Expect at [address] [place] [address]  After you have an endoscopy, you will stay at the hospital or clinic for 1 to 2 hours. This will allow the medicine to wear off. You will be able to go home after your doctor or nurse checks to make sure you are not having any problems. You may have to stay overnight if you had treatment during the test. You may have a sore throat for a day or two after the test.  This care sheet gives you a general idea about what to expect after the test.  How can you care for yourself at home? Activity  · Rest as much as you need to after you go home. · You should be able to go back to your usual activities the day after the test.  Diet  · Follow your doctor's directions for eating after the test.  · Drink plenty of fluids (unless your doctor has told you not to). Medications  · If you have a sore throat the day after the test, use an over-the-counter spray to numb your throat. Follow-up care is a key part of your treatment and safety. Be sure to make and go to all appointments, and call your doctor if you are having problems. It's also a good idea to know your test results and keep a list of the medicines you take. When should you call for help? Call 911 anytime you think you may need emergency care. For example, call if:  · You passed out (lost consciousness). · You cough up blood. · You vomit blood or what looks like coffee grounds. · You pass maroon or very bloody stools. Call your doctor now or seek immediate medical care if:  · You have trouble swallowing. · You have belly pain. · Your stools are black and tarlike or have streaks of blood. · You are sick to your stomach or cannot keep fluids down. Watch closely for changes in your health, and be sure to contact your doctor if:  · Your throat still hurts after a day or two. · You do not get better as expected. Where can you learn more?    Go to DealExplorer.be  Enter J454 in the search box to learn more about \"Upper GI Endoscopy: What to Expect at Home. \"   © 5382-0411 Healthwise, Incorporated. Care instructions adapted under license by [address] (which disclaims liability or warranty for this information). This care instruction is for use with your licensed healthcare professional. If you have questions about a medical condition or this instruction, always ask your healthcare professional. [address] any warranty or liability for your use of this information. Content Version: 78.8.214578; Current as of: November 14, 2014    DISCHARGE SUMMARY from Nurse     POST-PROCEDURE INSTRUCTIONS:    Call your Physician if you:  ? Observe any excess bleeding. ? Develop a temperature over 100.5o F.  ? Experience abdominal, shoulder or chest pain. ? Notice any signs of decreased circulation or nerve impairment to an extremity such as a change in color, persistent numbness, tingling, coldness or increase in pain. ? Vomit blood or you have nausea and vomiting lasting longer than 4 hours. ? Are unable to take medications. ? Are unable to urinate within 8 hours after discharge following general anesthesia or intravenous sedation. For the next 24 hours after receiving general anesthesia or intravenous sedation, or while taking prescription Narcotics, limit your activities:  ? Do NOT drive a motor vehicle, operate hazard machinery or power tools, or perform tasks that require coordination. The medication you received during your procedure may have some effect on your mental awareness. ? Do NOT make important personal or business decisions. The medication you received during your procedure may have some effect on your mental awareness. ? Do NOT drink alcoholic beverages. These drinks do not mix well with the medications that have been given to you. ? Upon discharge from the hospital, you must be accompanied by a responsible adult. ?  Resume your diet as directed by your physician. ? Resume medications as your physician has prescribed. ? Please give a list of your current medications to your Primary Care Provider. ? Please update this list whenever your medications are discontinued, doses are changed, or new medications (including over-the-counter products) are added. ? Please carry medication information at all times in case of emergency situations. These are general instructions for a healthy lifestyle:    No smoking/ No tobacco products/ Avoid exposure to second hand smoke.  Surgeon General's Warning:  Quitting smoking now greatly reduces serious risk to your health. Obesity, smoking, and a sedentary lifestyle greatly increase your risk for illness.  A healthy diet, regular physical exercise & weight monitoring are important for maintaining a healthy lifestyle   You may be retaining fluid if you have a history of heart failure or if you experience any of the following symptoms:  Weight gain of 3 pounds or more overnight or 5 pounds in a week, increased swelling in our hands or feet or shortness of breath while lying flat in bed. Please call your doctor as soon as you notice any of these symptoms; do not wait until your next office visit. Colorectal Screening   Colorectal cancer almost always develops from precancerous polyps (abnormal growths) in the colon or rectum. Screening tests can find precancerous polyps, so that they can be removed before they turn into cancer. Screening tests can also find colorectal cancer early, when treatment works best.  [place] Speak with your physician about when you should begin screening and how often you should be tested     Additional Information      Patient Education        Hiatal Hernia: Care Instructions  Your Care Instructions  A hiatal hernia occurs when part of the stomach bulges into the chest cavity. A hiatal hernia may allow stomach acid and juices to back up into the esophagus (acid reflux).  This can cause a feeling of burning, warmth, heat, or pain behind the breastbone. This feeling may often occur after you eat, soon after you lie down, or when you bend forward, and it may come and go. You also may have a sour taste in your mouth. These symptoms are commonly known as heartburn or reflux. But not all hiatal hernias cause symptoms. Follow-up care is a key part of your treatment and safety. Be sure to make and go to all appointments, and call your doctor if you are having problems. It's also a good idea to know your test results and keep a list of the medicines you take. How can you care for yourself at home? · Take your medicines exactly as prescribed. Call your doctor if you think you are having a problem with your medicine. · Do not take aspirin or other nonsteroidal anti-inflammatory drugs (NSAIDs), such as ibuprofen (Advil, Motrin) or naproxen (Aleve), unless your doctor says it is okay. Ask your doctor what you can take for pain. · Your doctor may recommend over-the-counter medicine. For mild or occasional indigestion, antacids such as Tums, Gaviscon, Maalox, or Mylanta may help. Your doctor also may recommend over-the-counter acid reducers, such as famotidine (Pepcid AC), cimetidine (Tagamet HB), or omeprazole (Prilosec). Read and follow all instructions on the label. If you use these medicines often, talk with your doctor. · Change your eating habits. ? It's best to eat several small meals instead of two or three large meals. ? After you eat, wait 2 to 3 hours before you lie down. Late-night snacks aren't a good idea. ? Chocolate, mint, and alcohol can make heartburn worse. They relax the valve between the esophagus and the stomach. ? Spicy foods, foods that have a lot of acid (like tomatoes and oranges), and coffee can make heartburn symptoms worse in some people. If your symptoms are worse after you eat a certain food, you may want to stop eating that food to see if your symptoms get better.   · Do not smoke or chew tobacco.  · If you get heartburn at night, raise the head of your bed 6 to 8 inches by putting the frame on blocks or placing a foam wedge under the head of your mattress. (Adding extra pillows does not work.)  · Do not wear tight clothing around your middle. · Lose weight if you need to. Losing just 5 to 10 pounds can help. When should you call for help? Call your doctor now or seek immediate medical care if:    · You have new or worse belly pain.     · You are vomiting. Watch closely for changes in your health, and be sure to contact your doctor if:    · You have new or worse symptoms of indigestion.     · You have trouble or pain swallowing.     · You are losing weight.     · You do not get better as expected. Where can you learn more? Go to http://www.burgess.com/  Enter T074 in the search box to learn more about \"Hiatal Hernia: Care Instructions. \"  Current as of: April 15, 2020               Content Version: 12.8  © 2006-2021 [employer]. Care instructions adapted under license by [employer] (which disclaims liability or warranty for this information). If you have questions about a medical condition or this instruction, always ask your healthcare professional. [place] [address] any warranty or liability for your use of this information. Educational references and/or instructions provided during this visit included:    See attached. APPOINTMENTS:    Per MD Instruction. Discharge information has been reviewed with the patient. The patient verbalized understanding.

## 2021-09-02 NOTE — ANESTHESIA PREPROCEDURE EVALUATION
Relevant Problems   CARDIOVASCULAR   (+) Essential hypertension with goal blood pressure less than 130/80      GASTROINTESTINAL   (+) Gastroesophageal reflux disease without esophagitis      ENDOCRINE   (+) Subclinical hypothyroidism   (+) Type 2 diabetes mellitus without complication (HCC)       Anesthetic History     PONV          Review of Systems / Medical History  Patient summary reviewed and pertinent labs reviewed    Pulmonary  Within defined limits                 Neuro/Psych         Psychiatric history     Cardiovascular    Hypertension          Hyperlipidemia    Exercise tolerance: <4 METS  Comments: EF 65% 2011   GI/Hepatic/Renal     GERD           Endo/Other    Diabetes: type 2  Hypothyroidism  Anemia     Other Findings              Physical Exam    Airway  Mallampati: II  TM Distance: > 6 cm  Neck ROM: normal range of motion   Mouth opening: Normal     Cardiovascular  Regular rate and rhythm,  S1 and S2 normal,  no murmur, click, rub, or gallop             Dental    Dentition: Poor dentition     Pulmonary  Breath sounds clear to auscultation               Abdominal  GI exam deferred       Other Findings            Anesthetic Plan    ASA: 3  Anesthesia type: MAC          Induction: Intravenous  Anesthetic plan and risks discussed with: Patient

## 2021-09-02 NOTE — PROGRESS NOTES
[email or web address] [address]  [place], [address]      Brief Procedure Note    Racquel Rossi  1965  [id number]    Date of Procedure: 9/2/2021    Preoperative diagnosis: BMI 27.0 - 27.9, adult:  Z68.27  Abdominal pain:  R10.9  Constipation:  564.00 - K59.00  Abnormal CT scan:  R93.89  Anemia:  285.9 - D64.9  Colon cancer Screening:  V76.51 - Z12.11    Postoperative diagnosis: gastric bx's R/O H. Pylori, hiatal hernia, mild gastritis    Type of Anesthesia: MAC (Monitored anesthesia care)    Description of findings: same as post op dx    Procedure: Procedure(s):  UPPER ENDOSCOPY with bx's    :  Dr. Chiqui Patel MD    Assistant(s): Endoscopy Technician-1: Gina Delarosa Staff: Cisco Gill RN    Devices/implants/grafts/tissues/prosthesis: None    EBL:None    Specimens:   ID Type Source Tests Collected by Time Destination   1 : body and antrum bx's Preservative Gastric  Daquan Mahmood MD 9/2/2021 1057 Pathology       Findings: See printed and scanned procedure note    Complications: None    Dr. Chiqui Patel MD  9/2/2021  11:04 AM

## 2021-09-02 NOTE — ANESTHESIA POSTPROCEDURE EVALUATION
Procedure(s):  UPPER ENDOSCOPY with bx's.     MAC    Anesthesia Post Evaluation      Multimodal analgesia: multimodal analgesia used between 6 hours prior to anesthesia start to PACU discharge  Patient location during evaluation: bedside  Patient participation: complete - patient participated  Level of consciousness: awake  Pain management: adequate  Airway patency: patent  Anesthetic complications: no  Cardiovascular status: stable  Respiratory status: acceptable  Hydration status: acceptable  Post anesthesia nausea and vomiting:  controlled      INITIAL Post-op Vital signs:   Vitals Value Taken Time   /89 09/02/21 1155   Temp 36.6 °C (97.8 °F) 09/02/21 1155   Pulse 68 09/02/21 1155   Resp 16 09/02/21 1155   SpO2 100 % 09/02/21 1155

## 2021-09-02 NOTE — H&P
[email or web address]  [phone]      GASTROENTEROLOGY Pre-Procedure H and P      Impression/Plan:   1. This patient is consented for an EGD for Abnormal CT scan-gastric thickening noted on CT scan-EGD for further. Addendum: All lab tests orders pertaining to the procedure have been ordered by the anesthesia personnel and results will be addressed by the anesthesia team  Chief Complaint: Abnormal CT scan-gastric thickening noted on CT scan-EGD for further. HPI:  Wilmer George is a 55 y.o. female who is being is having an EGD for Abnormal CT scan-gastric thickening noted on CT scan-EGD for further.   PMH:   Past Medical History:   Diagnosis Date    Breast cancer ([facility] [address]     right breast- chemo and radiation    H/O bilateral mastectomy     H/O seasonal allergies     H/O: pituitary tumor 2016    History of chemotherapy     Hypercholesterolemia     Hyperlipidemia 2016    initial     Nausea & vomiting     S/P radiation therapy [id number]    Vision loss     vision demise OD, temporal hemifield loss OS       PSH:   Past Surgical History:   Procedure Laterality Date    COLONOSCOPY N/A 2/15/2017    COLONOSCOPY performed by Mera Marcus MD at [address] [facility] Right 2017    RIGHT BREAST EXCIONAL BIOPSY performed by Eunice Brenner MD at [address] [place] [address] BREAST RECONSTRUCTION      HX GYN       x2    HX MASTECTOMY Bilateral     HX TUMOR REMOVAL  2016    pituitary tumor    IMPLANT BREAST SILICONE/EQ Bilateral 0463    NEUROLOGICAL PROCEDURE UNLISTED      Pituitary mass removed from brain through nose       Social HX:   Social History     Socioeconomic History    Marital status: SINGLE     Spouse name: Not on file    Number of children: Not on file    Years of education: Not on file    Highest education level: Not on file   Occupational History    Not on file   Tobacco Use    Smoking status: Never Smoker    Smokeless tobacco: Never Used   Vaping Use    Vaping Use: Never used   Substance and Sexual Activity    Alcohol use: No    Drug use: Never    Sexual activity: Not Currently   Other Topics Concern    Not on file   Social History Narrative    Not on file     Social Determinants of Health     Financial Resource Strain:     Difficulty of Paying Living Expenses:    Food Insecurity:     Worried About Running Out of Food in the Last Year:     [address] in the Last Year:    Transportation Needs:     Lack of Transportation (Medical):  Lack of Transportation (Non-Medical):    Physical Activity:     Days of Exercise per Week:     Minutes of Exercise per Session:    Stress:     Feeling of Stress :    Social Connections:     Frequency of Communication with Friends and Family:     Frequency of Social Gatherings with Friends and Family:     Attends [employer] Services:     Active Member of Clubs or Organizations:     Attends Club or Organization Meetings:     Marital Status:    Intimate Partner Violence:     Fear of Current or Ex-Partner:     Emotionally Abused:     Physically Abused:     Sexually Abused:        FHX:   Family History   Problem Relation Age of Onset    Hypertension Mother     Diabetes Mother     Hypertension Father     Diabetes Father     Cancer Neg Hx     Stroke Neg Hx     Heart Disease Neg Hx        Allergy:   Allergies   Allergen Reactions    Adhesive Tape-Silicones Rash    Morphine Itching and Nausea Only       Home Medications:     Medications Prior to Admission   Medication Sig    meclizine (ANTIVERT) 12.5 mg tablet Take 12.5 mg by mouth three (3) times daily as needed for Dizziness.  rosuvastatin (Crestor) 40 mg tablet Take 40 mg by mouth nightly. Review of Systems:     Constitutional: No fevers, chills, weight loss, fatigue. Skin: No rashes, pruritis, jaundice, ulcerations, erythema. HENT: No headaches, nosebleeds, sinus pressure, rhinorrhea, sore throat.    Eyes: No visual changes, blurred vision, eye pain, photophobia, jaundice. Cardiovascular: No chest pain, heart palpitations. Respiratory: No cough, SOB, wheezing, chest discomfort, orthopnea. Gastrointestinal:    Genitourinary: No dysuria, bleeding, discharge, pyuria. Musculoskeletal: No weakness, arthralgias, wasting. Endo: No sweats. Heme: No bruising, easy bleeding. Allergies: As noted. Neurological: Cranial nerves intact. Alert and oriented. Gait not assessed. Psychiatric:  No anxiety, depression, hallucinations. Visit Vitals  Ht 5' 4.5\" (1.638 m)   Wt 72.6 kg (160 lb)   BMI 27.04 kg/m²       Physical Assessment:     constitutional: appearance: well developed, well nourished, normal habitus, no deformities, in no acute distress. skin: inspection: no rashes, ulcers, icterus or other lesions; no clubbing or telangiectasias. palpation: no induration or subcutaneos nodules. eyes: inspection: normal conjunctivae and lids; no jaundice pupils: normal  ENMT: mouth: normal oral mucosa,lips and gums; good dentition. oropharynx: normal tongue, hard and soft palate; posterior pharynx without erithema, exudate or lesions. neck: thyroid: normal size, consistency and position; no masses or tenderness. respiratory: effort: normal chest excursion; no intercostal retraction or accessory muscle use. cardiovascular: abdominal aorta: normal size and position; no bruits. palpation: PMI of normal size and position; normal rhythm; no thrill or murmurs. abdominal: abdomen: normal consistency; no tenderness or masses. hernias: no hernias appreciated. liver: normal size and consistency. spleen: not palpable. rectal: hemoccult/guaiac: not performed. musculoskeletal: digits and nails: no clubbing, cyanosis, petechiae or other inflammatory conditions. gait: normal gait and station head and neck: normal range of motion; no pain, crepitation or contracture.  spine/ribs/pelvis: normal range of motion; no pain, deformity or contracture. neurologic: cranial nerves: II-XII normal.   psychiatric: judgement/insight: within normal limits. memory: within normal limits for recent and remote events. mood and affect: no evidence of depression, anxiety or agitation. orientation: oriented to time, space and person. Basic Metabolic Profile   No results for input(s): NA, K, CL, CO2, BUN, GLU, CA, MG, PHOS in the last 72 hours. No lab exists for component: CREAT      CBC w/Diff    No results for input(s): WBC, RBC, HGB, HCT, MCV, MCH, MCHC, RDW, PLT, HGBEXT, HCTEXT, PLTEXT in the last 72 hours. No lab exists for component: MPV No results for input(s): GRANS, LYMPH, EOS, PRO, MYELO, METAS, BLAST in the last 72 hours. No lab exists for component: MONO, BASO     Hepatic Function   No results for input(s): ALB, TP, TBILI, AP, AML, LPSE in the last 72 hours. No lab exists for component: DBILI, GPT, SGOT     Coags   No results for input(s): PTP, INR, APTT, INREXT in the last 72 hours. Alaina Patterson MD  [facility] [address], [address] [facility]  Cell: [phone]  Direct pager: [phone]  [email or web address]  [email or web address]

## 2022-03-12 ENCOUNTER — APPOINTMENT (OUTPATIENT)
Dept: GENERAL RADIOLOGY | Age: 57
End: 2022-03-12
Attending: EMERGENCY MEDICINE
Payer: MEDICARE

## 2022-03-12 ENCOUNTER — HOSPITAL ENCOUNTER (EMERGENCY)
Age: 57
Discharge: HOME | End: 2022-03-12
Attending: EMERGENCY MEDICINE | Admitting: EMERGENCY MEDICINE
Payer: MEDICARE

## 2022-03-12 ENCOUNTER — APPOINTMENT (OUTPATIENT)
Dept: CT IMAGING | Age: 57
End: 2022-03-12
Attending: EMERGENCY MEDICINE
Payer: MEDICARE

## 2022-03-12 VITALS
BODY MASS INDEX: 23.83 KG/M2 | HEART RATE: 64 BPM | SYSTOLIC BLOOD PRESSURE: 122 MMHG | DIASTOLIC BLOOD PRESSURE: 81 MMHG | OXYGEN SATURATION: 100 % | TEMPERATURE: 98.1 F | RESPIRATION RATE: 10 BRPM | WEIGHT: 141 LBS

## 2022-03-12 DIAGNOSIS — K59.00 CONSTIPATION, UNSPECIFIED CONSTIPATION TYPE: Primary | ICD-10-CM

## 2022-03-12 LAB
ALBUMIN SERPL-MCNC: 4.2 G/DL (ref 3.4–5)
ALBUMIN/GLOB SERPL: 1.2 {RATIO} (ref 0.8–1.7)
ALP SERPL-CCNC: 100 U/L (ref 45–117)
ALT SERPL-CCNC: 157 U/L (ref 13–56)
ANION GAP SERPL CALC-SCNC: 3 MMOL/L (ref 3–18)
APPEARANCE UR: NORMAL
AST SERPL-CCNC: 191 U/L (ref 10–38)
BASOPHILS # BLD: 0.1 K/UL (ref 0–0.1)
BASOPHILS NFR BLD: 2 % (ref 0–2)
BILIRUB SERPL-MCNC: 0.4 MG/DL (ref 0.2–1)
BILIRUB UR QL: NEGATIVE
BUN SERPL-MCNC: 11 MG/DL (ref 7–18)
BUN/CREAT SERPL: 9 (ref 12–20)
CALCIUM SERPL-MCNC: 9.4 MG/DL (ref 8.5–10.1)
CHLORIDE SERPL-SCNC: 101 MMOL/L (ref 100–111)
CO2 SERPL-SCNC: 28 MMOL/L (ref 21–32)
COLOR UR: YELLOW
CREAT SERPL-MCNC: 1.18 MG/DL (ref 0.6–1.3)
DIFFERENTIAL METHOD BLD: ABNORMAL
EOSINOPHIL # BLD: 0 K/UL (ref 0–0.4)
EOSINOPHIL NFR BLD: 1 % (ref 0–5)
ERYTHROCYTE [DISTWIDTH] IN BLOOD BY AUTOMATED COUNT: 14.2 % (ref 11.6–14.5)
GLOBULIN SER CALC-MCNC: 3.5 G/DL (ref 2–4)
GLUCOSE SERPL-MCNC: 92 MG/DL (ref 74–99)
GLUCOSE UR STRIP.AUTO-MCNC: NEGATIVE MG/DL
HCT VFR BLD AUTO: 29.9 % (ref 35–45)
HGB BLD-MCNC: 9.7 G/DL (ref 12–16)
HGB UR QL STRIP: NEGATIVE
IMM GRANULOCYTES # BLD AUTO: 0 K/UL
IMM GRANULOCYTES NFR BLD AUTO: 0 %
KETONES UR QL STRIP.AUTO: NEGATIVE MG/DL
LEUKOCYTE ESTERASE UR QL STRIP.AUTO: NEGATIVE
LYMPHOCYTES # BLD: 1.5 K/UL (ref 0.9–3.6)
LYMPHOCYTES NFR BLD: 57 % (ref 21–52)
MCH RBC QN AUTO: 28.6 PG (ref 24–34)
MCHC RBC AUTO-ENTMCNC: 32.4 G/DL (ref 31–37)
MCV RBC AUTO: 88.2 FL (ref 78–100)
MONOCYTES # BLD: 0.1 K/UL (ref 0.05–1.2)
MONOCYTES NFR BLD: 4 % (ref 3–10)
NEUTS SEG # BLD: 1 K/UL (ref 1.8–8)
NEUTS SEG NFR BLD: 36 % (ref 40–73)
NITRITE UR QL STRIP.AUTO: NEGATIVE
NRBC # BLD: 0 K/UL (ref 0–0.01)
NRBC BLD-RTO: 0 PER 100 WBC
PH UR STRIP: 7 [PH] (ref 5–8)
PLATELET # BLD AUTO: 132 K/UL (ref 135–420)
PLATELET COMMENTS,PCOM: ABNORMAL
POTASSIUM SERPL-SCNC: 4 MMOL/L (ref 3.5–5.5)
PROT SERPL-MCNC: 7.7 G/DL (ref 6.4–8.2)
PROT UR STRIP-MCNC: NEGATIVE MG/DL
RBC # BLD AUTO: 3.39 M/UL (ref 4.2–5.3)
RBC MORPH BLD: ABNORMAL
SODIUM SERPL-SCNC: 132 MMOL/L (ref 136–145)
SP GR UR REFRACTOMETRY: 1.02 (ref 1–1.03)
UROBILINOGEN UR QL STRIP.AUTO: 1 EU/DL (ref 0.2–1)
WBC # BLD AUTO: 2.7 K/UL (ref 4.6–13.2)

## 2022-03-12 PROCEDURE — 85025 COMPLETE CBC W/AUTO DIFF WBC: CPT

## 2022-03-12 PROCEDURE — 81003 URINALYSIS AUTO W/O SCOPE: CPT

## 2022-03-12 PROCEDURE — 74018 RADEX ABDOMEN 1 VIEW: CPT

## 2022-03-12 PROCEDURE — 74011250637 HC RX REV CODE- 250/637: Performed by: EMERGENCY MEDICINE

## 2022-03-12 PROCEDURE — 80053 COMPREHEN METABOLIC PANEL: CPT

## 2022-03-12 PROCEDURE — 74176 CT ABD & PELVIS W/O CONTRAST: CPT

## 2022-03-12 PROCEDURE — 99284 EMERGENCY DEPT VISIT MOD MDM: CPT

## 2022-03-12 PROCEDURE — 87086 URINE CULTURE/COLONY COUNT: CPT

## 2022-03-12 RX ORDER — GLYCERIN ADULT
1 SUPPOSITORY, RECTAL RECTAL
Qty: 14 SUPPOSITORY | Refills: 0 | Status: SHIPPED | OUTPATIENT
Start: 2022-03-12

## 2022-03-12 RX ORDER — MAGNESIUM CITRATE
296 SOLUTION, ORAL ORAL
Status: COMPLETED | OUTPATIENT
Start: 2022-03-12 | End: 2022-03-12

## 2022-03-12 RX ORDER — BISACODYL 5 MG
TABLET, DELAYED RELEASE (ENTERIC COATED) ORAL
Qty: 20 TABLET | Refills: 0 | Status: SHIPPED | OUTPATIENT
Start: 2022-03-12

## 2022-03-12 RX ADMIN — MAGNESIUM CITRATE 296 ML: 1.75 LIQUID ORAL at 15:05

## 2022-03-12 NOTE — ED TRIAGE NOTES
Pt here via EMS with c/o left sided abdominal pain and constipation x 7 days. Pt recently diagnosed with kidney stones.

## 2022-03-12 NOTE — ED PROVIDER NOTES
EMERGENCY DEPARTMENT HISTORY AND PHYSICAL EXAM    Date: 3/12/2022  Patient Name: Rob Garcia    History of Presenting Illness     Chief Complaint   Patient presents with    Abdominal Pain    Constipation         History Provided By: Patient    Additional History (Context): Rob Garcia is a 55 y.o. female with hyperlipidemia and malignancy who presents with no bowel movement for over a week. She says she is tried [place] and some sort of other medication her PCP prescribed. With vomiting 2 days ago. Is having flatus. Also reporting per her son who spoke with nurse she may also have kidney stones. Denies melena hematochezia. Last colonoscopy was 5 years ago and was reportedly normal per patient. Denies chronic narcotic use. PCP: STEFANIA Burks    Current Outpatient Medications   Medication Sig Dispense Refill    bisacodyL (Dulcolax, bisacodyl,) 5 mg EC tablet Take 1 tablet by mouth twice a day as needed for constipation. 20 Tablet 0    glycerin, adult, suppository Insert 1 Suppository into rectum two (2) times daily as needed for PRN Reason (Other) (constipation). 14 Suppository 0    meclizine (ANTIVERT) 12.5 mg tablet Take 12.5 mg by mouth three (3) times daily as needed for Dizziness.  rosuvastatin (Crestor) 40 mg tablet Take 40 mg by mouth nightly.          Past History     Past Medical History:  Past Medical History:   Diagnosis Date    Breast cancer ([facility] [address] 2010    right breast- chemo and radiation    H/O bilateral mastectomy 2010    H/O seasonal allergies     H/O: pituitary tumor 06/2016    History of chemotherapy     Hypercholesterolemia     Hyperlipidemia 05/2016    initial     Nausea & vomiting     S/P radiation therapy [id number]    Vision loss     vision demise OD, temporal hemifield loss OS       Past Surgical History:  Past Surgical History:   Procedure Laterality Date    COLONOSCOPY N/A 2/15/2017    COLONOSCOPY performed by Brooks Yin MD at [facility] ENDOSCOPY    HX BREAST BIOPSY Right 2017    RIGHT BREAST EXCIONAL BIOPSY performed by Dominique Jerez MD at [address] [place] [address] HX BREAST RECONSTRUCTION      HX GYN       x2    HX MASTECTOMY Bilateral 2010    HX TUMOR REMOVAL  2016    pituitary tumor    IMPLANT BREAST SILICONE/EQ Bilateral 1966    NEUROLOGICAL PROCEDURE UNLISTED      Pituitary mass removed from brain through nose       Family History:  Family History   Problem Relation Age of Onset   [facility] Hypertension Mother     Diabetes Mother     Hypertension Father     Diabetes Father     Cancer Neg Hx     Stroke Neg Hx     Heart Disease Neg Hx        Social History:  Social History     Tobacco Use    Smoking status: Never Smoker    Smokeless tobacco: Never Used   Vaping Use    Vaping Use: Never used   Substance Use Topics    Alcohol use: No    Drug use: Never       Allergies: Allergies   Allergen Reactions    Adhesive Tape-Silicones Rash    Morphine Itching and Nausea Only         Review of Systems   Review of Systems   Constitutional: Negative for fever. Gastrointestinal: Positive for abdominal pain, constipation, nausea and vomiting. All Other Systems Negative  Physical Exam     Vitals:    22 1030 22 1130 22 1230 22 1400   BP: 113/80 125/83 116/81 122/81   Pulse: 70 76 62 60   Resp: 16 16 9 11   Temp:       SpO2: 99% 100% 99% 99%   Weight:         Physical Exam  Vitals and nursing note reviewed. Constitutional:       Appearance: She is well-developed. HENT:      Head: Normocephalic and atraumatic. Eyes:      Pupils: Pupils are equal, round, and reactive to light. Neck:      Thyroid: No thyromegaly. Vascular: No JVD. Trachea: No tracheal deviation. Cardiovascular:      Rate and Rhythm: Normal rate and regular rhythm. Heart sounds: Normal heart sounds. No murmur heard. No friction rub. No gallop.     Pulmonary:      Effort: Pulmonary effort is normal. No respiratory distress. Breath sounds: Normal breath sounds. No stridor. No wheezing or rales. Chest:      Chest wall: No tenderness. Abdominal:      General: There is no distension. Palpations: Abdomen is soft. There is no mass. Tenderness: There is no abdominal tenderness. There is no guarding or rebound. Genitourinary:     Rectum: Normal. No mass. Comments: No rectal mass palpated; stool in vault. Musculoskeletal:         General: No tenderness. Lymphadenopathy:      Cervical: No cervical adenopathy. Skin:     General: Skin is warm and dry. Coloration: Skin is not pale. Findings: No erythema or rash. Neurological:      Mental Status: She is alert and oriented to person, place, and time. Psychiatric:         Behavior: Behavior normal.         Thought Content: Thought content normal.            Diagnostic Study Results     Labs -     Recent Results (from the past 12 hour(s))   CBC WITH AUTOMATED DIFF    Collection Time: 03/12/22 10:22 AM   Result Value Ref Range    WBC 2.7 (L) 4.6 - 13.2 K/uL    RBC 3.39 (L) 4.20 - 5.30 M/uL    HGB 9.7 (L) 12.0 - 16.0 g/dL    HCT 29.9 (L) 35.0 - 45.0 %    MCV 88.2 78.0 - 100.0 FL    MCH 28.6 24.0 - 34.0 PG    MCHC 32.4 31.0 - 37.0 g/dL    RDW 14.2 11.6 - 14.5 %    PLATELET 980 (L) 556 - 420 K/uL    NRBC 0.0 0  WBC    ABSOLUTE NRBC 0.00 0.00 - 0.01 K/uL    NEUTROPHILS 36 (L) 40 - 73 %    LYMPHOCYTES 57 (H) 21 - 52 %    MONOCYTES 4 3 - 10 %    EOSINOPHILS 1 0 - 5 %    BASOPHILS 2 0 - 2 %    IMMATURE GRANULOCYTES 0 %    ABS. NEUTROPHILS 1.0 (L) 1.8 - 8.0 K/UL    ABS. LYMPHOCYTES 1.5 0.9 - 3.6 K/UL    ABS. MONOCYTES 0.1 0.05 - 1.2 K/UL    ABS. EOSINOPHILS 0.0 0.0 - 0.4 K/UL    ABS. BASOPHILS 0.1 0.0 - 0.1 K/UL    ABS. IMM.  GRANS. 0.0 K/UL    DF MANUAL      PLATELET COMMENTS ADEQUATE PLATELETS      RBC COMMENTS NORMOCYTIC, NORMOCHROMIC     METABOLIC PANEL, COMPREHENSIVE    Collection Time: 03/12/22 10:22 AM   Result Value Ref Range Sodium 132 (L) 136 - 145 mmol/L    Potassium 4.0 3.5 - 5.5 mmol/L    Chloride 101 100 - 111 mmol/L    CO2 28 21 - 32 mmol/L    Anion gap 3 3.0 - 18 mmol/L    Glucose 92 74 - 99 mg/dL    BUN 11 7.0 - 18 MG/DL    Creatinine 1.18 0.6 - 1.3 MG/DL    BUN/Creatinine ratio 9 (L) 12 - 20      GFR est AA 58 (L) >60 ml/min/1.73m2    GFR est non-AA 48 (L) >60 ml/min/1.73m2    Calcium 9.4 8.5 - 10.1 MG/DL    Bilirubin, total 0.4 0.2 - 1.0 MG/DL    ALT (SGPT) 157 (H) 13 - 56 U/L    AST (SGOT) 191 (H) 10 - 38 U/L    Alk. phosphatase 100 45 - 117 U/L    Protein, total 7.7 6.4 - 8.2 g/dL    Albumin 4.2 3.4 - 5.0 g/dL    Globulin 3.5 2.0 - 4.0 g/dL    A-G Ratio 1.2 0.8 - 1.7     URINALYSIS W/ RFLX MICROSCOPIC    Collection Time: 03/12/22 12:45 PM   Result Value Ref Range    Color YELLOW      Appearance CLOUDY      Specific gravity 1.020 1.005 - 1.030      pH (UA) 7.0 5.0 - 8.0      Protein Negative NEG mg/dL    Glucose Negative NEG mg/dL    Ketone Negative NEG mg/dL    Bilirubin Negative NEG      Blood Negative NEG      Urobilinogen 1.0 0.2 - 1.0 EU/dL    Nitrites Negative NEG      Leukocyte Esterase Negative NEG         Radiologic Studies -   CT ABD PELV WO CONT   Final Result      No definite CT evidence for acute process. Mild pericardial effusion. Hepatic steatosis. Otherwise similar findings as above. XR ABD (KUB)   Final Result   As above. CT Results  (Last 48 hours)               03/12/22 1209  CT ABD PELV WO CONT Final result    Impression:      No definite CT evidence for acute process. Mild pericardial effusion. Hepatic steatosis. Otherwise similar findings as above. Narrative:  CT ABD PELV WO CONT:       CLINICAL INFORMATION   left lower abd pain.        COMPARISON   3 May 2021, 26 January 2020, 19 June 2017       TECHNIQUE   Axial CT images of the abdomen and pelvis obtained following the administration   of intravenous contrast. Sagittal and coronal reformations performed. The following CT dose reduction techniques were utilized: automated exposure   control and/or adjustment of the mA and/or kV according to patient size, and the   use of iterative reconstruction technique       FINDINGS   Lung base: Mild pericardial effusion. Right lower lobe subpleural nodule   measures 1.8 x 0.8 cm, fairly stable. Previously noted additional left lung base   and lingular nodules are not visualized on this study, likely representing a   resolved inflammatory process. Liver: No solid liver lesion. Gallbladder and biliary tree: Layering hyperdensity of the gallbladder Normal   caliber wall. No intra- or extrahepatic biliary ductal dilation. Pancreas: Normal.   Spleen: Normal.   Adrenals: Normal.   Kidneys and ureters: Normal.       Stomach: Nonspecific circumferential wall thickening of the gastric antrum,   similar. Small bowel: Normal caliber. Large bowel: Retained fecal material noted throughout including within slightly   distended rectum. Please correct clinically for constipation. Lymph nodes: No pathologically enlarged lymph nodes. Vessels: No aneurysm. Peritoneum: No ascites or free air. No other fluid collection. Retroperitoneum: Normal.   Abdominal/chest wall: Bilateral breast implants. Marginal skin thickening mid   abdomen, similar. Bladder: Normal.   Reproductive organs: Chronic calcified left adnexal mass. Osseous structures: No destructive osseous lesions. .               CXR Results  (Last 48 hours)    None            Medical Decision Making   I am the first provider for this patient. I reviewed the vital signs, available nursing notes, past medical history, past surgical history, family history and social history. Vital Signs-Reviewed the patient's vital signs. Records Reviewed: Nursing Notes    Procedures:  Procedures    Provider Notes (Medical Decision Making): No obstructive pattern on x-ray. Patient had a CT scan as there was family concerned about possibly a kidney stone. Patient has had small success with magnesium citrate and soapsuds enema. Wants to go home and continue to try. Will send Colace as well as glycerin suppositories to preferred pharmacy. MED RECONCILIATION:  No current facility-administered medications for this encounter. Current Outpatient Medications   Medication Sig    bisacodyL (Dulcolax, bisacodyl,) 5 mg EC tablet Take 1 tablet by mouth twice a day as needed for constipation.  glycerin, adult, suppository Insert 1 Suppository into rectum two (2) times daily as needed for PRN Reason (Other) (constipation).  meclizine (ANTIVERT) 12.5 mg tablet Take 12.5 mg by mouth three (3) times daily as needed for Dizziness.  rosuvastatin (Crestor) 40 mg tablet Take 40 mg by mouth nightly. Disposition:  home    DISCHARGE NOTE:   3:40 PM    Pt has been reexamined. Patient has no new complaints, changes, or physical findings. Care plan outlined and precautions discussed. Results of labs, CT, x-ray were reviewed with the patient. All medications were reviewed with the patient; will d/c home with dulcolax, glycerin. All of pt's questions and concerns were addressed. Patient was instructed and agrees to follow up with PCP, as well as to return to the ED upon further deterioration. Patient is ready to go home.     Follow-up Information     Follow up With Specialties Details Why Contact Info    STEFANIA Warren Physician Assistant Schedule an appointment as soon as possible for a visit in 2 days  [address]  [facility] [facility] EMERGENCY DEPT Emergency Medicine  If symptoms worsen return immediately [address]  [phone]          Current Discharge Medication List      START taking these medications    Details   bisacodyL (Dulcolax, bisacodyl,) 5 mg EC tablet Take 1 tablet by mouth twice a day as needed for constipation. Qty: 20 Tablet, Refills: 0  Start date: 3/12/2022      glycerin, adult, suppository Insert 1 Suppository into rectum two (2) times daily as needed for PRN Reason (Other) (constipation). Qty: 14 Suppository, Refills: 0  Start date: 3/12/2022                 Diagnosis     Clinical Impression:   1.  Constipation, unspecified constipation type

## 2022-03-13 LAB
BACTERIA SPEC CULT: NORMAL
CC UR VC: NORMAL
SERVICE CMNT-IMP: NORMAL

## 2024-08-27 NOTE — ED PROVIDER NOTES
EMERGENCY DEPARTMENT HISTORY AND PHYSICAL EXAM    Date: 1/25/2020  Patient Name: Edin Bryant    History of Presenting Illness     Chief Complaint   Patient presents with    Dizziness    Vomiting         History Provided By: Patient's Son and Patient's Daughter        Additional History (Context): Edin Bryant is a 49 y.o. female with PMH of breast cancer, pituitary cancer, open angle glaucoma, type II diabetes  who presents with new multiple syncopal episodes and reccurent intermittent abdominal pain that started today. Family is here with patient answering questions on her behalf. Son states that they found the patient on the bathroom floor this morning while she was having a bowel movement. Pt states having recurrent syncopal episodes due to severe suprapubic abdominal pain. Pt reports neck pain which started after falling on the bathroom floor. Pt also reports tinnitus, body aching, and reduced appetite. Son reports a similar episode of abd pain and syncopal episodes last year which lasted for one day, family brought her to the ED for a workup and all results came back negative. Pt denies  fever, chills, n/v/d,urinary sx, chest pain, sob, palpitation or hx of seizures. PCP: STEFANIA Briscoe    Current Outpatient Medications   Medication Sig Dispense Refill    potassium chloride SR (K-TAB) 20 mEq tablet Take 1 Tab by mouth two (2) times a day. 6 Tab 0    cyclobenzaprine (FLEXERIL) 5 mg tablet Take 1 Tab by mouth three (3) times daily as needed for Muscle Spasm(s). 10 Tab 1    ondansetron (ZOFRAN ODT) 4 mg disintegrating tablet Take 1-2 tablets every 6-8 hours as needed for nausea and vomiting. 10 Tab 0    polyethylene glycol (MIRALAX) 17 gram packet Take 17 g by mouth daily.  oxyCODONE-acetaminophen (PERCOCET) 5-325 mg per tablet Take 1 Tab by mouth every four (4) hours as needed for Pain. Max Daily Amount: 6 Tabs.  16 Tab 0    ferrous sulfate (IRON) 325 mg (65 mg iron) EC tablet Take 1 Tab by mouth two (2) times a day. 28 Tab 0    ibuprofen (MOTRIN) 800 mg tablet Take 1 Tab by mouth three (3) times daily as needed for Pain. 40 Tab 0    senna-docusate (DOC-Q-LAX) 8.6-50 mg per tablet Take 1 Tab by mouth daily. 30 Tab 11    hydroCHLOROthiazide (HYDRODIURIL) 25 mg tablet Take 1 Tab by mouth daily. 90 Tab 1    metFORMIN ER (GLUCOPHAGE XR) 500 mg tablet Take 1 Tab by mouth two (2) times daily (with meals). 180 Tab 1    atorvastatin (LIPITOR) 40 mg tablet Take 1 Tab by mouth daily. 90 Tab 1    amLODIPine (NORVASC) 5 mg tablet Take 1 Tab by mouth daily. 90 Tab 1    glimepiride (AMARYL) 2 mg tablet Take 1 Tab by mouth every morning. 90 Tab 1    raNITIdine (ZANTAC) 150 mg tablet Take 1 Tab by mouth two (2) times a day. 180 Tab 3    albuterol (PROVENTIL HFA, VENTOLIN HFA, PROAIR HFA) 90 mcg/actuation inhaler Take 1 Puff by inhalation every four (4) hours as needed for Wheezing or Shortness of Breath. 1 Inhaler 0    glucose blood VI test strips (ASCENSIA AUTODISC VI, ONE TOUCH ULTRA TEST VI) strip Check glucose daily in the AM 50 Strip 0    Lancets misc Check glucose daily. One touch Ultra lancets.  48 Each 0       Past History     Past Medical History:  Past Medical History:   Diagnosis Date    Breast cancer ([facility] [address] 2010    right breast- chemo and radiation    Diabetes ([facility] [address] 4/2016    GERD (gastroesophageal reflux disease)     H/O bilateral mastectomy 2010    H/O seasonal allergies     H/O: pituitary tumor 06/2016    History of chemotherapy     Hyperlipidemia 05/2016    initial     Hypertension 4/2016    Nausea & vomiting     S/P radiation therapy [id number]    Subclinical hypothyroidism 05/2016    Thyroid nodule 09/2016    multiple- no suspicous concerns    Vision loss     vision demise OD, temporal hemifield loss OS       Past Surgical History:  Past Surgical History:   Procedure Laterality Date    COLONOSCOPY N/A 2/15/2017    COLONOSCOPY performed by Jennifer Belle Girish Allen MD at [facility]    HX BREAST BIOPSY Right 2017    RIGHT BREAST EXCIONAL BIOPSY performed by Matilda Rivera MD at [address] [place] [address] HX GYN       x2    HX MASTECTOMY Bilateral     HX TUMOR REMOVAL  2016    pituitary tumor    IMPLANT BREAST SILICONE/EQ Bilateral 6929       Family History:  Family History   Problem Relation Age of Onset   [address] [facility] Hypertension Mother     Diabetes Mother     Hypertension Father     Diabetes Father     Cancer Neg Hx     Stroke Neg Hx     Heart Disease Neg Hx        Social History:  Social History     Tobacco Use    Smoking status: Never Smoker    Smokeless tobacco: Never Used   Substance Use Topics    Alcohol use: No    Drug use: No       Allergies: Allergies   Allergen Reactions    Adhesive Tape-Silicones Rash    Morphine Itching         Review of Systems   Review of Systems   Constitutional: Positive for appetite change (decreased appetite ) and unexpected weight change ( Pt had significant weight loss from chemo). Negative for fever. HENT: Positive for tinnitus. Negative for rhinorrhea and sore throat. Eyes: Negative. Respiratory: Positive for cough. Negative for chest tightness and shortness of breath. Cardiovascular: Negative for chest pain and palpitations. Gastrointestinal: Positive for abdominal pain. Negative for blood in stool. Genitourinary: Negative for difficulty urinating, dysuria, hematuria, vaginal bleeding and vaginal pain. Musculoskeletal: Positive for neck pain. Neurological: Positive for syncope (multiple episodes throughout the day) and weakness. Negative for seizures and facial asymmetry. All Other Systems Negative  Physical Exam     Vitals:    20 2029 20 0100 20 0215   BP: 119/83 111/72 93/64   Pulse: 74 67 64   Resp: 18 13 14   Temp: 97.4 °F (36.3 °C)     SpO2: 98%       Physical Exam  HENT:      Head: Normocephalic and atraumatic.       Nose: Nose normal.   Cardiovascular: Rate and Rhythm: Normal rate and regular rhythm. Heart sounds: Normal heart sounds, S1 normal and S2 normal.   Pulmonary:      Effort: Pulmonary effort is normal.      Breath sounds: Normal breath sounds. Musculoskeletal:      Right lower leg: No edema. Left lower leg: No edema. Skin:     General: Skin is warm. Capillary Refill: Capillary refill takes less than 2 seconds. Nails: There is no clubbing. Neurological:      Mental Status: She is alert and oriented to person, place, and time. GCS: GCS eye subscore is 4. GCS verbal subscore is 5. GCS motor subscore is 6. Motor: No tremor or seizure activity. Psychiatric:         Speech: She is noncommunicative. Behavior: Behavior is uncooperative and slowed. Diagnostic Study Results     Labs -     No results found for this or any previous visit (from the past 12 hour(s)). Radiologic Studies -   CT HEAD WO CONT   Final Result   IMPRESSION:      1. No evidence of acute intracranial abnormality. 2. Focal mild right cerebellar atrophy, possibly prior insult/infarct. CT SPINE CERV WO CONT   Final Result   IMPRESSION:       1. No evidence of acute fracture or subluxation. 2. Mild multilevel degenerative changes. CT ABD PELV W CONT   Final Result   IMPRESSION:      1. No evidence of acute process in the abdomen or pelvis. 2. Mild hepatic steatosis. 3. Cholelithiasis versus milk of calcium bile. No acute cholecystitis. Mildly   dilated common bile duct 7 mm, consider evaluation with MRCP. 4. Chronic calcified 3 cm left adnexal mass. CT Results  (Last 48 hours)               01/26/20 0030  CT HEAD WO CONT Final result    Impression:  IMPRESSION:       1. No evidence of acute intracranial abnormality. 2. Focal mild right cerebellar atrophy, possibly prior insult/infarct.        Narrative:  Head CT without contrast       HISTORY: Syncope and sharp abdominal pain   COMPARISON: CT head 5/30/2016       Technique: CT images of the head were obtained. All CT scans at this facility   are performed using dose optimization technique as appropriate to the performed   exam, to include automated exposure control, adjustment of the mA and/or kV   according to patient's size (Including appropriate matching for site-specific   examinations), or use of iterative reconstruction technique. FINDINGS:    No intracranial hemorrhage, extra-axial fluid collection or mass effect. No   shift of midline structures. No evidence for acute ischemia. Nonspecific small   focal mild medial right cerebellar atrophy. Intact osseous structures. The visualized paranasal air sinuses are aerated. 01/26/20 0030  CT SPINE CERV WO CONT Final result    Impression:  IMPRESSION:        1. No evidence of acute fracture or subluxation. 2. Mild multilevel degenerative changes. Narrative:  EXAM: CT SPINE CERV WO CONT       INDICATION: Syncope and fall       COMPARISON: CT chest 2/22/2011. TECHNIQUE: Unenhanced multislice helical CT of the cervical spine was performed   in the axial plane and sagittal and coronal reformations were generated. CT   dose reduction was achieved through use of a standardized protocol tailored for   this examination and automatic exposure control for dose modulation. FINDINGS:       The alignment of the cervical spine is normal.        The vertebral body heights and disc spaces are well-preserved. Mild multilevel   endplate osteophytes and facet hypertrophy. There is no fracture or subluxation. The prevertebral soft tissues are normal.       The odontoid process is intact. Visualized lung apices demonstrate stable right apical 5 mm nodule and pleural   scarring. 01/26/20 0030  CT ABD PELV W CONT Final result    Impression:  IMPRESSION:       1. No evidence of acute process in the abdomen or pelvis. 2. Mild hepatic steatosis.    3. Cholelithiasis versus milk of calcium bile. No acute cholecystitis. Mildly   dilated common bile duct 7 mm, consider evaluation with MRCP. 4. Chronic calcified 3 cm left adnexal mass. Narrative:  CT abdomen and pelvis with enhancement       INDICATION: Sharp abdominal pain and vomiting       TECHNIQUE: CT volumetric imaging obtained from the diaphragm to the level of the   pubic symphysis following the uneventful administration of oral and nonionic   intravenous contrast. Coronal, sagittal and axial reformations obtained. Patient   received 100 mL  Isovue 300 intravenously       All CT scans at this facility are performed using dose optimization technique as   appropriate to the performed exam, to include automated exposure control,   adjustment of the mA and/or kV according to patient's size (Including   appropriate matching for site-specific examinations), or use of iterative   reconstruction technique. COMPARISON: CT abdomen and pelvis 6/19/2017       FINDINGS:    Lung bases: Chronic right middle lobe and lateral right lower lobe linear   pleural parenchymal scarring   Liver: Mild hepatic steatosis   Gallbladder: Small amount of layering hyperdense material may represent tiny   layering calcified stones or milk of calcium. Mildly dilated common bile duct 7   mm, increased from 5 mm on 2017. No visible choledocholithiasis or luminal   abnormality. Spleen: Unremarkable   Pancreas: Unremarkable   Adrenal glands: Unremarkable       Left Kidney: Unremarkable   Right Kidney: Unremarkable   Bladder: Decompressed with a urinary catheter   Other pelvic organs:Calcified 3 cm left adnexal mass. Stomach and bowel:No bowel obstruction. No dilated or distended bowel loops. No   significant focal bowel wall thickening. Peritoneal spaces: No free intraperitoneal fluid or gas. Vessels:Non aneurysmal.   Lymph nodes: No enlargement   Abdominal wall: No hernia. Bilateral breast implants noted. SKELETAL AND BODY WALL FINDINGS:   No destructive lesion. CXR Results  (Last 48 hours)    None            Medical Decision Making   I am the first provider for this patient. I reviewed the vital signs, available nursing notes, past medical history, past surgical history, family history and social history. Vital Signs-Reviewed the patient's vital signs. Records Reviewed: Nursing Notes and Old Medical Records    Procedures:  Procedures    Provider Notes (Medical Decision Making):   Upon hx and physical exam pt is AOx4, [address] is 15, pt is laying in bed responsive to questions, keeps complaining of neck and abd pain. I ordered cardiac enzymes, Ct scan of the heard, neck and abdomen to rule out a bleed, Fx, tumors. Flu is negative, troponin levels are mildly elevated, UA and glucose levels are nl    DDX: FLU, STEMI, arrhythmia, seizures, psychogenic, orthostatic hypotension. Vasovagal syncope. The patient's entire work-up labs and imaging is negative for concerning findings. I did have a long conversation with the patient's daughter and son regarding appropriate follow-up with primary care. Return to the emergency room for any worsening or concerning symptoms. MED RECONCILIATION:  No current facility-administered medications for this encounter. Current Outpatient Medications   Medication Sig    potassium chloride SR (K-TAB) 20 mEq tablet Take 1 Tab by mouth two (2) times a day.  cyclobenzaprine (FLEXERIL) 5 mg tablet Take 1 Tab by mouth three (3) times daily as needed for Muscle Spasm(s).  ondansetron (ZOFRAN ODT) 4 mg disintegrating tablet Take 1-2 tablets every 6-8 hours as needed for nausea and vomiting.  polyethylene glycol (MIRALAX) 17 gram packet Take 17 g by mouth daily.  oxyCODONE-acetaminophen (PERCOCET) 5-325 mg per tablet Take 1 Tab by mouth every four (4) hours as needed for Pain. Max Daily Amount: 6 Tabs.     ferrous sulfate (IRON) 325 mg (65 mg iron) EC tablet Take 1 Tab by mouth two (2) times a day.  ibuprofen (MOTRIN) 800 mg tablet Take 1 Tab by mouth three (3) times daily as needed for Pain.  senna-docusate (DOC-Q-LAX) 8.6-50 mg per tablet Take 1 Tab by mouth daily.  hydroCHLOROthiazide (HYDRODIURIL) 25 mg tablet Take 1 Tab by mouth daily.  metFORMIN ER (GLUCOPHAGE XR) 500 mg tablet Take 1 Tab by mouth two (2) times daily (with meals).  atorvastatin (LIPITOR) 40 mg tablet Take 1 Tab by mouth daily.  amLODIPine (NORVASC) 5 mg tablet Take 1 Tab by mouth daily.  glimepiride (AMARYL) 2 mg tablet Take 1 Tab by mouth every morning.  raNITIdine (ZANTAC) 150 mg tablet Take 1 Tab by mouth two (2) times a day.  albuterol (PROVENTIL HFA, VENTOLIN HFA, PROAIR HFA) 90 mcg/actuation inhaler Take 1 Puff by inhalation every four (4) hours as needed for Wheezing or Shortness of Breath.  glucose blood VI test strips (ASCENSIA AUTODISC VI, ONE TOUCH ULTRA TEST VI) strip Check glucose daily in the AM    Lancets misc Check glucose daily. One touch Ultra lancets. Disposition:  Home    DISCHARGE NOTE:     Pt has been reexamined. Patient's has no new complaints, changes, or physical findings. Care plan outlined and precautions discussed. Results of labs, imaging and exam were reviewed with the patient's family. All medications were reviewed with the patient; will d/c home with follow up and return precaustions. All of pt's questions and concerns were addressed. Patient was instructed and agrees to follow up with primary care and to call on Monday, as well as to return to the ED upon further deterioration. Patient is ready to go home.     Follow-up Information     Follow up With Specialties Details Why Contact Info    STEFANIA Astudillo Physician Assistant Schedule an appointment as soon as possible for a visit For follow up [address] [facility] [address] [facility] EMERGENCY DEPT Emergency Medicine If symptoms worsen [address] [place] [id number]  [phone]          Discharge Medication List as of 1/26/2020  2:34 AM              Diagnosis     Clinical Impression:   1. Adult failure to thrive    2. Syncope and collapse    3.  Anorexia hair removal not indicated

## (undated) DEVICE — SYRINGE MED 25GA 3ML L5/8IN SUBQ PLAS W/ DETACH NDL SFTY

## (undated) DEVICE — BASIN EMESIS 500CC ROSE 250/CS 60/PLT: Brand: MEDEGEN MEDICAL PRODUCTS, LLC

## (undated) DEVICE — AIRLIFE™ NASAL OXYGEN CANNULA CURVED, FLARED TIP WITH 14 FOOT (4.3 M) CRUSH-RESISTANT TUBING, OVER-THE-EAR STYLE: Brand: AIRLIFE™

## (undated) DEVICE — DERMABOND SKIN ADH 0.7ML -- DERMABOND ADVANCED 12/BX

## (undated) DEVICE — CATHETER SUCT TR FL TIP 14FR W/ O CTRL

## (undated) DEVICE — MEDI-VAC NON-CONDUCTIVE SUCTION TUBING: Brand: CARDINAL HEALTH

## (undated) DEVICE — REM POLYHESIVE ADULT PATIENT RETURN ELECTRODE: Brand: VALLEYLAB

## (undated) DEVICE — SYR 50ML SLIP TIP NSAF LF STRL --

## (undated) DEVICE — FLEX ADVANTAGE 3000CC: Brand: FLEX ADVANTAGE

## (undated) DEVICE — PACK PROCEDURE SURG MAJ W/ BASIN LF

## (undated) DEVICE — BITE BLOCK ENDOSCP UNIV AD 6 TO 9.4 MM

## (undated) DEVICE — ENDOSCOPY PUMP TUBING/ CAP SET: Brand: ERBE

## (undated) DEVICE — GOWN ISOL IMPERV UNIV, DISP, OPEN BACK, BLUE --

## (undated) DEVICE — 3M™ BAIR PAWS FLEX™ WARMING GOWN, STANDARD, 20 PER CASE 81003: Brand: BAIR PAWS™

## (undated) DEVICE — (D)SYR 10ML 1/5ML GRAD NSAF -- PKGING CHANGE USE ITEM 338027

## (undated) DEVICE — AIRLIFE™ NASAL OXYGEN CANNULA CURVED, NONFLARED TIP WITH 14 FOOT (4.3 M) CRUSH-RESISTANT TUBING, OVER-THE-EAR STYLE: Brand: AIRLIFE™

## (undated) DEVICE — DRAPE TWL SURG 16X26IN BLU ORB04] ALLCARE INC]

## (undated) DEVICE — KIT CLN UP BON SECOURS MARYV

## (undated) DEVICE — SUT SLK 2-0SH 30IN BLK --

## (undated) DEVICE — SOLUTION IRRIG 1000ML H2O STRL BLT

## (undated) DEVICE — MEDI-VAC SUCTION HIGH CAPACITY: Brand: CARDINAL HEALTH

## (undated) DEVICE — GAUZE,SPONGE,4"X4",16PLY,STRL,LF,10/TRAY: Brand: MEDLINE

## (undated) DEVICE — PREP SKN CHLRAPRP 26ML TNT -- CONVERT TO ITEM 373320

## (undated) DEVICE — CANNULA ORIG TL CLR W FOAM CUSHIONS AND 14FT SUPL TB 3 CHN

## (undated) DEVICE — STERILE POLYISOPRENE POWDER-FREE SURGICAL GLOVES: Brand: PROTEXIS

## (undated) DEVICE — SYRINGE MED 20ML STD CLR PLAS LUERLOCK TIP N CTRL DISP

## (undated) DEVICE — FLUFF AND POLYMER UNDERPAD,EXTRA HEAVY: Brand: WINGS

## (undated) DEVICE — SUTURE VCRL SZ 3-0 L27IN ABSRB UD L26MM SH 1/2 CIR J416H

## (undated) DEVICE — CATHETER THOR 36FR DIA10.7MM POLYVI CHL TRCR TIP STR SFT

## (undated) DEVICE — FCPS RAD JAW 4LC 240CM W/NDL -- BX/20 RADIAL JAW 4

## (undated) DEVICE — SUTURE MCRYL SZ 4-0 L18IN ABSRB UD L19MM PS-2 3/8 CIR PRIM Y496G

## (undated) DEVICE — GAUZE SPONGES,16 PLY: Brand: CURITY